# Patient Record
Sex: MALE | Race: WHITE | NOT HISPANIC OR LATINO | Employment: OTHER | URBAN - METROPOLITAN AREA
[De-identification: names, ages, dates, MRNs, and addresses within clinical notes are randomized per-mention and may not be internally consistent; named-entity substitution may affect disease eponyms.]

---

## 2017-03-11 ENCOUNTER — HOSPITAL ENCOUNTER (EMERGENCY)
Facility: HOSPITAL | Age: 42
Discharge: HOME | End: 2017-03-12
Attending: EMERGENCY MEDICINE
Payer: COMMERCIAL

## 2017-03-11 ENCOUNTER — APPOINTMENT (EMERGENCY)
Dept: RADIOLOGY | Facility: HOSPITAL | Age: 42
End: 2017-03-11
Payer: COMMERCIAL

## 2017-03-11 VITALS
RESPIRATION RATE: 16 BRPM | SYSTOLIC BLOOD PRESSURE: 134 MMHG | TEMPERATURE: 98.2 F | OXYGEN SATURATION: 96 % | BODY MASS INDEX: 20.04 KG/M2 | DIASTOLIC BLOOD PRESSURE: 64 MMHG | WEIGHT: 140 LBS | HEART RATE: 81 BPM

## 2017-03-11 DIAGNOSIS — N50.82 SCROTAL PAIN: ICD-10-CM
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
## 2017-03-11 DIAGNOSIS — M79.651 ACUTE PAIN OF RIGHT THIGH: ICD-10-CM

## 2017-03-11 PROCEDURE — 72170 X-RAY EXAM OF PELVIS: CPT

## 2017-03-11 PROCEDURE — 73552 X-RAY EXAM OF FEMUR 2/>: CPT

## 2017-03-11 RX ORDER — IBUPROFEN 600 MG/1
600 TABLET ORAL ONCE
Status: COMPLETED | OUTPATIENT
Start: 2017-03-11 | End: 2017-03-11

## 2017-03-11 RX ORDER — LIDOCAINE 50 MG/G
1 PATCH TOPICAL ONCE
Status: COMPLETED | OUTPATIENT
Start: 2017-03-11 | End: 2017-03-11

## 2017-03-11 RX ADMIN — IBUPROFEN 600 MG: 600 TABLET, FILM COATED ORAL at 23:23

## 2017-03-11 RX ADMIN — LIDOCAINE 1 PATCH: 50 PATCH CUTANEOUS at 23:24

## 2017-03-12 PROCEDURE — 99284 EMERGENCY DEPT VISIT MOD MDM: CPT

## 2017-03-12 RX ORDER — NAPROXEN 375 MG/1
375 TABLET ORAL
Qty: 15 TABLET | Refills: 0 | Status: SHIPPED | OUTPATIENT
Start: 2017-03-12 | End: 2017-04-10

## 2017-04-10 ENCOUNTER — HOSPITAL ENCOUNTER (EMERGENCY)
Facility: HOSPITAL | Age: 42
Discharge: HOME | End: 2017-04-10
Attending: EMERGENCY MEDICINE | Admitting: EMERGENCY MEDICINE
Payer: COMMERCIAL

## 2017-04-10 VITALS
TEMPERATURE: 97.7 F | OXYGEN SATURATION: 97 % | SYSTOLIC BLOOD PRESSURE: 126 MMHG | HEART RATE: 69 BPM | DIASTOLIC BLOOD PRESSURE: 56 MMHG | RESPIRATION RATE: 15 BRPM

## 2017-04-10 DIAGNOSIS — R10.9 LEFT FLANK PAIN: Primary | ICD-10-CM

## 2017-04-10 DIAGNOSIS — M79.18 MUSCULAR ABDOMINAL PAIN IN LEFT FLANK: ICD-10-CM

## 2017-04-10 PROCEDURE — 99283 EMERGENCY DEPT VISIT LOW MDM: CPT

## 2017-04-10 RX ORDER — NAPROXEN 375 MG/1
375 TABLET ORAL 2 TIMES DAILY WITH MEALS
Qty: 20 TABLET | Refills: 0 | Status: SHIPPED | OUTPATIENT
Start: 2017-04-10 | End: 2017-05-03 | Stop reason: ALTCHOICE

## 2017-04-10 RX ORDER — LIDOCAINE 50 MG/G
1 PATCH TOPICAL ONCE
Status: DISCONTINUED | OUTPATIENT
Start: 2017-04-10 | End: 2017-04-10 | Stop reason: HOSPADM

## 2017-04-10 RX ORDER — LIDOCAINE 50 MG/G
1 PATCH TOPICAL EVERY 24 HOURS
Qty: 15 PATCH | Refills: 0 | Status: SHIPPED | OUTPATIENT
Start: 2017-04-10 | End: 2017-05-03 | Stop reason: ALTCHOICE

## 2017-04-10 RX ADMIN — LIDOCAINE 1 PATCH: 50 PATCH CUTANEOUS at 01:39

## 2017-05-03 ENCOUNTER — HOSPITAL ENCOUNTER (EMERGENCY)
Facility: HOSPITAL | Age: 42
Discharge: HOME | End: 2017-05-03
Payer: COMMERCIAL

## 2017-05-03 VITALS
SYSTOLIC BLOOD PRESSURE: 131 MMHG | TEMPERATURE: 98 F | HEART RATE: 82 BPM | OXYGEN SATURATION: 96 % | WEIGHT: 170.3 LBS | RESPIRATION RATE: 16 BRPM | DIASTOLIC BLOOD PRESSURE: 59 MMHG | BODY MASS INDEX: 24.38 KG/M2
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
## 2017-05-03 PROCEDURE — 99281 EMR DPT VST MAYX REQ PHY/QHP: CPT

## 2017-06-14 ENCOUNTER — APPOINTMENT (EMERGENCY)
Dept: RADIOLOGY | Facility: HOSPITAL | Age: 42
End: 2017-06-14
Payer: COMMERCIAL

## 2017-06-14 ENCOUNTER — HOSPITAL ENCOUNTER (EMERGENCY)
Facility: HOSPITAL | Age: 42
Discharge: HOME | End: 2017-06-14
Attending: EMERGENCY MEDICINE | Admitting: EMERGENCY MEDICINE
Payer: COMMERCIAL

## 2017-06-14 VITALS
TEMPERATURE: 97.9 F | DIASTOLIC BLOOD PRESSURE: 74 MMHG | RESPIRATION RATE: 18 BRPM | BODY MASS INDEX: 20.04 KG/M2 | SYSTOLIC BLOOD PRESSURE: 122 MMHG | WEIGHT: 140 LBS | OXYGEN SATURATION: 98 % | HEART RATE: 73 BPM

## 2017-06-14 DIAGNOSIS — R07.9 CHEST PAIN: Primary | ICD-10-CM

## 2017-06-14 LAB
ANION GAP SERPL CALCULATED.3IONS-SCNC: 4 MMOL/L (ref 4–13)
ATRIAL RATE: 69 BPM
BASOPHILS # BLD AUTO: 0.11 THOUSANDS/ΜL (ref 0–0.1)
BASOPHILS NFR BLD AUTO: 1 % (ref 0–1)
BUN SERPL-MCNC: 12 MG/DL (ref 5–25)
CALCIUM SERPL-MCNC: 8.8 MG/DL (ref 8.3–10.1)
CHLORIDE SERPL-SCNC: 105 MMOL/L (ref 100–108)
CO2 SERPL-SCNC: 32 MMOL/L (ref 21–32)
CREAT SERPL-MCNC: 1.07 MG/DL (ref 0.6–1.3)
DEPRECATED D DIMER PPP: <270 NG/ML (FEU) (ref 0–424)
EOSINOPHIL # BLD AUTO: 0.82 THOUSAND/ΜL (ref 0–0.61)
EOSINOPHIL NFR BLD AUTO: 8 % (ref 0–6)
ERYTHROCYTE [DISTWIDTH] IN BLOOD BY AUTOMATED COUNT: 13 % (ref 11.6–15.1)
GFR SERPL CREATININE-BSD FRML MDRD: >60 ML/MIN/1.73SQ M
GLUCOSE SERPL-MCNC: 105 MG/DL (ref 65–140)
HCT VFR BLD AUTO: 42.7 % (ref 36.5–49.3)
HGB BLD-MCNC: 14.8 G/DL (ref 12–17)
LYMPHOCYTES # BLD AUTO: 3.93 THOUSANDS/ΜL (ref 0.6–4.47)
LYMPHOCYTES NFR BLD AUTO: 39 % (ref 14–44)
MCH RBC QN AUTO: 31 PG (ref 26.8–34.3)
MCHC RBC AUTO-ENTMCNC: 34.7 G/DL (ref 31.4–37.4)
MCV RBC AUTO: 90 FL (ref 82–98)
MONOCYTES # BLD AUTO: 0.73 THOUSAND/ΜL (ref 0.17–1.22)
MONOCYTES NFR BLD AUTO: 7 % (ref 4–12)
NEUTROPHILS # BLD AUTO: 4.5 THOUSANDS/ΜL (ref 1.85–7.62)
NEUTS SEG NFR BLD AUTO: 45 % (ref 43–75)
NRBC BLD AUTO-RTO: 0 /100 WBCS
P AXIS: 36 DEGREES
PLATELET # BLD AUTO: 239 THOUSANDS/UL (ref 149–390)
PMV BLD AUTO: 10.7 FL (ref 8.9–12.7)
POTASSIUM SERPL-SCNC: 3.5 MMOL/L (ref 3.5–5.3)
PR INTERVAL: 126 MS
QRS AXIS: 69 DEGREES
QRSD INTERVAL: 80 MS
QT INTERVAL: 390 MS
QTC INTERVAL: 417 MS
RBC # BLD AUTO: 4.77 MILLION/UL (ref 3.88–5.62)
SODIUM SERPL-SCNC: 141 MMOL/L (ref 136–145)
SPECIMEN SOURCE: NORMAL
SPECIMEN SOURCE: NORMAL
T WAVE AXIS: 65 DEGREES
TROPONIN I BLD-MCNC: 0 NG/ML (ref 0–0.08)
TROPONIN I BLD-MCNC: 0.01 NG/ML (ref 0–0.08)
VENTRICULAR RATE: 69 BPM
WBC # BLD AUTO: 10.09 THOUSAND/UL (ref 4.31–10.16)

## 2017-06-14 PROCEDURE — 80048 BASIC METABOLIC PNL TOTAL CA: CPT | Performed by: EMERGENCY MEDICINE

## 2017-06-14 PROCEDURE — 84484 ASSAY OF TROPONIN QUANT: CPT

## 2017-06-14 PROCEDURE — 71020 HB CHEST X-RAY 2VW FRONTAL&LATL: CPT

## 2017-06-14 PROCEDURE — 85379 FIBRIN DEGRADATION QUANT: CPT | Performed by: EMERGENCY MEDICINE

## 2017-06-14 PROCEDURE — 85025 COMPLETE CBC W/AUTO DIFF WBC: CPT | Performed by: EMERGENCY MEDICINE

## 2017-06-14 PROCEDURE — 99285 EMERGENCY DEPT VISIT HI MDM: CPT

## 2017-06-14 PROCEDURE — 36415 COLL VENOUS BLD VENIPUNCTURE: CPT

## 2017-06-14 PROCEDURE — 93005 ELECTROCARDIOGRAM TRACING: CPT | Performed by: EMERGENCY MEDICINE

## 2017-06-14 PROCEDURE — 93005 ELECTROCARDIOGRAM TRACING: CPT

## 2017-06-14 RX ORDER — NAPROXEN 500 MG/1
500 TABLET ORAL 2 TIMES DAILY WITH MEALS
Qty: 20 TABLET | Refills: 0 | Status: SHIPPED | OUTPATIENT
Start: 2017-06-14 | End: 2017-11-12

## 2017-06-15 LAB
ATRIAL RATE: 58 BPM
ATRIAL RATE: 58 BPM
P AXIS: 49 DEGREES
P AXIS: 59 DEGREES
PR INTERVAL: 152 MS
PR INTERVAL: 168 MS
QRS AXIS: 65 DEGREES
QRS AXIS: 67 DEGREES
QRSD INTERVAL: 82 MS
QRSD INTERVAL: 82 MS
QT INTERVAL: 424 MS
QT INTERVAL: 430 MS
QTC INTERVAL: 416 MS
QTC INTERVAL: 422 MS
T WAVE AXIS: 45 DEGREES
T WAVE AXIS: 53 DEGREES
VENTRICULAR RATE: 58 BPM
VENTRICULAR RATE: 58 BPM

## 2017-06-19 ENCOUNTER — GENERIC CONVERSION - ENCOUNTER (OUTPATIENT)
Dept: OTHER | Facility: OTHER | Age: 42
End: 2017-06-19

## 2017-06-23 ENCOUNTER — HOSPITAL ENCOUNTER (OUTPATIENT)
Dept: NON INVASIVE DIAGNOSTICS | Facility: HOSPITAL | Age: 42
Discharge: HOME | End: 2017-06-23
Attending: EMERGENCY MEDICINE
Payer: COMMERCIAL

## 2017-06-23 PROCEDURE — 93017 CV STRESS TEST TRACING ONLY: CPT

## 2017-06-30 ENCOUNTER — ALLSCRIPTS OFFICE VISIT (OUTPATIENT)
Dept: OTHER | Facility: OTHER | Age: 42
End: 2017-06-30

## 2017-06-30 DIAGNOSIS — R07.9 CHEST PAIN: ICD-10-CM

## 2017-09-21 ENCOUNTER — GENERIC CONVERSION - ENCOUNTER (OUTPATIENT)
Dept: OTHER | Facility: OTHER | Age: 42
End: 2017-09-21

## 2017-09-21 ENCOUNTER — APPOINTMENT (OUTPATIENT)
Dept: LAB | Facility: CLINIC | Age: 42
End: 2017-09-21
Payer: COMMERCIAL

## 2017-09-21 DIAGNOSIS — R07.9 CHEST PAIN: ICD-10-CM

## 2017-09-21 LAB
CRP SERPL QL: <3 MG/L
ERYTHROCYTE [SEDIMENTATION RATE] IN BLOOD: 8 MM/HOUR (ref 0–10)

## 2017-09-21 PROCEDURE — 86140 C-REACTIVE PROTEIN: CPT

## 2017-09-21 PROCEDURE — 36415 COLL VENOUS BLD VENIPUNCTURE: CPT

## 2017-09-21 PROCEDURE — 85652 RBC SED RATE AUTOMATED: CPT

## 2017-11-12 ENCOUNTER — APPOINTMENT (EMERGENCY)
Dept: RADIOLOGY | Facility: HOSPITAL | Age: 42
End: 2017-11-12
Payer: COMMERCIAL

## 2017-11-12 ENCOUNTER — HOSPITAL ENCOUNTER (EMERGENCY)
Facility: HOSPITAL | Age: 42
Discharge: HOME | End: 2017-11-12
Admitting: EMERGENCY MEDICINE
Payer: COMMERCIAL

## 2017-11-12 VITALS
SYSTOLIC BLOOD PRESSURE: 117 MMHG | TEMPERATURE: 98.2 F | HEART RATE: 84 BPM | OXYGEN SATURATION: 95 % | DIASTOLIC BLOOD PRESSURE: 55 MMHG | RESPIRATION RATE: 16 BRPM

## 2017-11-12 DIAGNOSIS — J40 BRONCHITIS: Primary | ICD-10-CM

## 2017-11-12 PROCEDURE — 71020 HB CHEST X-RAY 2VW FRONTAL&LATL: CPT

## 2017-11-12 PROCEDURE — 99283 EMERGENCY DEPT VISIT LOW MDM: CPT

## 2017-11-12 PROCEDURE — 94640 AIRWAY INHALATION TREATMENT: CPT

## 2017-11-12 RX ORDER — ALBUTEROL SULFATE 90 UG/1
2 AEROSOL, METERED RESPIRATORY (INHALATION) EVERY 6 HOURS PRN
Qty: 1 INHALER | Refills: 0 | Status: SHIPPED | OUTPATIENT
Start: 2017-11-12 | End: 2018-11-12

## 2017-11-12 RX ORDER — PREDNISONE 20 MG/1
20 TABLET ORAL 2 TIMES DAILY WITH MEALS
Qty: 10 TABLET | Refills: 0 | Status: SHIPPED | OUTPATIENT
Start: 2017-11-12 | End: 2017-11-17

## 2017-11-12 RX ADMIN — IPRATROPIUM BROMIDE 0.5 MG: 0.5 SOLUTION RESPIRATORY (INHALATION) at 21:28

## 2017-11-12 RX ADMIN — ALBUTEROL SULFATE 5 MG: 2.5 SOLUTION RESPIRATORY (INHALATION) at 21:28

## 2017-11-13 NOTE — ED PROVIDER NOTES
History  Chief Complaint   Patient presents with    Cough     cough for 3 days  History provided by:  Patient  Cough   Cough characteristics:  Non-productive  Severity:  Moderate  Onset quality:  Gradual  Duration:  2 weeks  Progression:  Waxing and waning  Context: exposure to allergens, sick contacts, smoke exposure and upper respiratory infection    Context: not animal exposure, not fumes, not occupational exposure, not weather changes and not with activity    Relieved by:  Nothing  Ineffective treatments:  Cough suppressants  Associated symptoms: no myalgias and no sore throat        None       Past Medical History:   Diagnosis Date    No known health problems     No known health problems        Past Surgical History:   Procedure Laterality Date    HIP SURGERY Right     screws and plate    JOINT REPLACEMENT      TOTAL HIP ARTHROPLASTY         History reviewed  No pertinent family history  I have reviewed and agree with the history as documented  Social History   Substance Use Topics    Smoking status: Current Every Day Smoker     Packs/day: 0 20    Smokeless tobacco: Never Used    Alcohol use No        Review of Systems   Constitutional: Negative for activity change, appetite change and fatigue  HENT: Negative for nosebleeds, sneezing, sore throat, trouble swallowing and voice change  Eyes: Negative for photophobia, pain and visual disturbance  Respiratory: Positive for cough  Negative for apnea, choking and stridor  Cardiovascular: Negative for palpitations and leg swelling  Gastrointestinal: Negative for anal bleeding and constipation  Endocrine: Negative for cold intolerance, heat intolerance, polydipsia and polyphagia  Genitourinary: Negative for decreased urine volume, enuresis, frequency, genital sores and urgency  Musculoskeletal: Negative for joint swelling and myalgias  Allergic/Immunologic: Negative for environmental allergies and food allergies  Neurological: Negative for tremors, seizures, speech difficulty and weakness  Hematological: Negative for adenopathy  Psychiatric/Behavioral: Negative for behavioral problems, decreased concentration, dysphoric mood and hallucinations  Physical Exam  ED Triage Vitals [11/12/17 2021]   Temperature Pulse Respirations Blood Pressure SpO2   98 2 °F (36 8 °C) 84 16 117/55 95 %      Temp Source Heart Rate Source Patient Position - Orthostatic VS BP Location FiO2 (%)   Oral -- Sitting Left arm --      Pain Score       --           Orthostatic Vital Signs  Vitals:    11/12/17 2021   BP: 117/55   Pulse: 84   Patient Position - Orthostatic VS: Sitting       Physical Exam   Constitutional: He is oriented to person, place, and time  He appears well-developed and well-nourished  No distress  HENT:   Head: Normocephalic and atraumatic  Right Ear: External ear normal    Left Ear: External ear normal    Nose: Nose normal    Mouth/Throat: Oropharynx is clear and moist    Eyes: Conjunctivae and EOM are normal  Pupils are equal, round, and reactive to light  Neck: Normal range of motion  Neck supple  Cardiovascular: Normal rate, regular rhythm and normal heart sounds  Exam reveals no gallop and no friction rub  No murmur heard  Pulmonary/Chest: Effort normal  No respiratory distress  He has wheezes  Abdominal: Soft  Bowel sounds are normal    Neurological: He is alert and oriented to person, place, and time  Skin: Skin is warm and dry  He is not diaphoretic  Psychiatric: He has a normal mood and affect  His behavior is normal    Vitals reviewed        ED Medications  Medications   albuterol inhalation solution 5 mg (not administered)   ipratropium (ATROVENT) 0 02 % inhalation solution 0 5 mg (not administered)       Diagnostic Studies  Results Reviewed     None                 XR chest 2 views   ED Interpretation by Aldo Mathias PA-C (11/12 2123)   No acute abnormalities Procedures  Procedures       Phone Contacts  ED Phone Contact    ED Course  ED Course                                MDM  CritCare Time    Disposition  Final diagnoses:   Bronchitis     Time reflects when diagnosis was documented in both MDM as applicable and the Disposition within this note     Time User Action Codes Description Comment    11/12/2017  9:24 PM Willy, [facility] Bronchitis       ED Disposition     ED Disposition Condition Comment    Discharge  [facility]  discharge to home/self care  Condition at discharge: Stable        Follow-up Information     Follow up With Specialties Details Why Contact Info    Maulik Burton MD Internal Medicine Schedule an appointment as soon as possible for a visit  [address]  [phone]          Patient's Medications   Discharge Prescriptions    ALBUTEROL (PROVENTIL HFA,VENTOLIN HFA) 90 MCG/ACT INHALER    Inhale 2 puffs every 6 (six) hours as needed for wheezing       Start Date: 11/12/2017End Date: 11/12/2018       Order Dose: 2 puffs       Quantity: 1 Inhaler    Refills: 0    PREDNISONE 20 MG TABLET    Take 1 tablet by mouth 2 (two) times a day with meals for 5 days       Start Date: 11/12/2017End Date: 11/17/2017       Order Dose: 20 mg       Quantity: 10 tablet    Refills: 0     No discharge procedures on file      ED Provider  Electronically Signed by           Arabella Fernandez PA-C  11/12/17 4942

## 2017-11-13 NOTE — DISCHARGE INSTRUCTIONS

## 2018-01-09 NOTE — PROGRESS NOTES
Patient Health Assessment    Date:            12/08/2016  Blood Pressure:  120/71  Pulse:           68  Age:             41  Weight:          0 lbs  Height/Length:   0' 0"  Body Mass Index: 0 0  Provider:        LUCIA  Clinic:          [facility]        Medical Alert: Tobacco User    Hip surgery  Medications: Percocet 5mg/325mg    Percocet 5mg/325mg    Percocet 5mg/325mg    Amoxicillin 500 MG  Allergies:  Since Last Visit: Medical Alert: No Change    Medications: No Change    Allergies:        No Change  Pain Scale Type: Numeric Pain ScalePain Level: 6  Description:  Patient presented for emergency visit  [address]; No contra to dx tx  Pain lower right side  #28 and 29 are severely decayed and non-restorable  No swelling  PAP on #29 seen on xrays  recommended to return for both ext on  #28 and 29  Rx sent online    NV exts  MQ    ----- Signed on Thursday, December 08, 2016 at 10:46:59 AM  -----  ----- Provider: [email or web address] Queen Schirmer, DDS -- Clinic: [place] -----

## 2018-01-10 NOTE — PROGRESS NOTES
Patient Health Assessment    Date:            12/14/2016  Blood Pressure:  120/76  Pulse:           96  Age:             41  Weight:          0 lbs  Height/Length:   0' 0"  Body Mass Index: 0 0  Provider:        LUCIA  Clinic:          [facility]: Tobacco User    Hip surgery  Medications: Percocet 5mg/325mg    Percocet 5mg/325mg    Percocet 5mg/325mg    Amoxicillin 500 MG    Amoxicillin 500 MG    Ibuprofen 600 MG  Allergies:  Since Last Visit: Medical Alert: No Change    Medications: No Change    Allergies:        No Change  Pain Scale Type: Numeric Pain ScalePain Level: 0  Description:  Patient presents for ext #28, 34  Reviewed PMH, patient denies any changes  Explained benefits and risks of  procedure to pt, pt understands and consents to treatment  Signed and  uploaded consent form to doc center  Applied topical benzocaine, administered 2 carps of 2% Lidocaine w/ 1:100,000  via DELPHINE block and infiltration  Gingiva elevated with #9 periosteal elevator,  tooth #28, 29 luxated with straight elevators and removed with forceps  Socket curetted  Chromic gut 3-0 sutures placed  Placed gauze and provided  verbal and written POI  Instructed pt to take 600mg ibuprofen and 500mg tylenol   PRN pain  Pt tolerated procedure well, left satisfied and ambulatory      NV: Comp exam    AH/MQ    ----- Signed on Wednesday, December 14, 2016 at 1:59:45 PM  -----  ----- Provider: JENNIFER - [facility], Dentist -- Clinic: [place] -----

## 2018-01-10 NOTE — PROGRESS NOTES
Medical Alert: Tobacco User    Hip surgery  Medications: Percocet 5mg/325mg    Percocet 5mg/325mg    Percocet 5mg/325mg    Amoxicillin 500 MG    Amoxicillin 500 MG    Ibuprofen 600 MG  Allergies:  Since Last Visit: Medical Alert: No Change    Medications: No Change    Allergies:        No Change  Pain Scale Type: Numeric Pain ScalePain Level: 0  Description:    Patient Health Assessment    Date:            09/21/2017  Blood Pressure:  116/76  Pulse:           75  Age:             41  Weight:          146 lbs  Height/Length:   5' 10"  Body Mass Index: 20 8  Provider:        [email or web address]  Clinic:          [facility]      Pt presents for #10 DIL  PMH review, no changes  Discussed with patient need  for RCT if pulp exposure occurs or in future if pulp is inflamed  Pt  understands and consents  Applied topical benzocaine, administered 1 carps 4%  articaine 1:100k epi via local infiltration  Prep with 245 carbide on high  speed  Caries removed with round carbide on slow speed  Placed mylar strip  Isolation with cotton rolls and dri-angles  Limelite liner placed  Etch with  37% H2PO4, rinse, dry  Applied Vividbond with 15 second scrubx2, gentle air dry   and light cured  Restored with Beautifil flowable and Alpha II composite shade   A3 and light cured  Refined with finishing burs, polished with enhance  point  Verified occlusion and contacts  Pt informed that due to how deep the  caries is, there is a high risk that a RCT may be needed for #10 in the future,   Pt understood  Pt left satisfied and ambulatory      NV: CompX and FMX if Pt is interested    BH/MQ    ----- Signed on Thursday, September 21, 2017 at 4:49:14 PM  -----  ----- Provider: [email or web address] - [facility], Dentist -- Clinic: [facility] -----

## 2018-01-11 NOTE — PROGRESS NOTES
Patient Health Assessment    Date:            06/19/2017  Blood Pressure:  109/74  Pulse:           59  Age:             41  Weight:          146 lbs  Height/Length:   0' 0"  Body Mass Index: 0 0  Provider:        LUCIA  Clinic:          [facility]: Tobacco User    Hip surgery  Medications: Percocet 5mg/325mg    Percocet 5mg/325mg    Percocet 5mg/325mg    Amoxicillin 500 MG    Amoxicillin 500 MG    Ibuprofen 600 MG  Allergies:  Since Last Visit: Medical Alert: No Change    Medications: No Change    Allergies:        No Change  Pain Scale Type: Numeric Pain ScalePain Level: 0  Description:  Pt presents for limited exam with CC ''I chipped a tooth at a wedding last  weekend''  PMH reviewed, no changes  Obtained PA #10  Tooth #10 is grossly  decayed with decay radiographically extremely close to pulp, no PAP present  Palpation (-)  percussion (-) endo ice (vital)  Pt denies any history of pain  from tooth  Discussed with patient that tooth has large cavity that will likely   go into the nerve and that tooth will likely need RCT or ext  Offered tx of  caries excavation and resin or pulpectomy and CaOH placement depending on  extend of decay  Pt understands and consents to treatment  Advised pt to schedule for comprehensive exam  Pt seems uninterested but said  ok  NV: caries excavation, if pulp exposure access canal and place CaOH until pt  decides on ext or RCT      Essie/TORIBIO    ----- Signed on Monday, June 19, 2017 at 2:02:28 PM  -----  ----- Provider: GALA - [facility], Dentist -- Clinic: [facility] -----

## 2018-01-12 NOTE — PROGRESS NOTES
1 PA LR  pt here for emergency visit  MU: pt not aware of his height or weight  Medical Alert: Tobacco User    Hip surgery-broke hip/ had screws + plate placed  Medications:  none  Allergies:      none  Since Last Visit: Medical Alert: Change    Medications: No Change    Allergies:        No Change  Pain Scale Type: Numeric Pain ScalePain Level: 3  Description: LR cracked tooth/ some pain for approximately one week  - on /off pain - pt has been taking Ibuprofen for pain which is giving relief  from the pain  will have dr carbajal limited exam/ eval    ----- Signed on Thursday, December 08, 2016 at 10:20:47 AM  -----  ----- Provider: [email or web address] Brittnee Maxwell RD -- Clinic: [address] [place] [address] -----

## 2018-01-13 VITALS
DIASTOLIC BLOOD PRESSURE: 66 MMHG | SYSTOLIC BLOOD PRESSURE: 100 MMHG | HEART RATE: 76 BPM | TEMPERATURE: 97.8 F | HEIGHT: 69 IN | WEIGHT: 175.04 LBS | BODY MASS INDEX: 25.93 KG/M2

## 2018-01-15 NOTE — PROGRESS NOTES
Pt presents for limited exam with CC ''tooth hurts and I was swollen this  morning lower left''  PMH reviewed, no changes  Obtained PA #22  Tooth #22  has gross decay involving the pulp  Palpation (+),  percussion (+)  Presented  tx option of RCT/crown, palliative pulpectomy, or ext  Pt elects for ext but  does not want it done today  Provided prescription for amoxicillin, scheduled  pt for ext #22  Discussed with pt that he needs comp exam and definitive  treatment  Pt understands, left ambulatory and satisfied      NV: ext #22  NNV: comp exam, FMX    Essie/MQ    ----- Signed on Monday, September 12, 2016 at 4:52:40 PM  -----  ----- Provider: [email or web address] - [facility], Dentist -- Clinic: [facility] -----

## 2018-01-15 NOTE — PROGRESS NOTES
Patient Health Assessment    Date:            09/12/2016  Blood Pressure:  122/72  Pulse:           71  Age:             40  Weight:          0 lbs  Height/Length:   0' 0"  Body Mass Index: 0 0  Provider:        [email or web address]  Clinic:          [facility]      2 PA's taken  Medical Alert: Tobacco User  Medications: none currently  Allergies:      none  Since Last Visit: Medical Alert: No Change    Medications: No Change    Allergies:        No Change  Pain Scale Type: Numeric Pain ScalePain Level: 5  Description: LL gums red and swollen  pain present - started last night - pain comes and goes  pt was taking Advil for pain  pt reports face swollen this morning- used ice to reduce swelling  Location: (* )Tooth--pt points to LL side/canine area and posterior    ()Other  Quality:  ()Dull    ()Sharp    ()Throbbing    ()Unable to assess  Duration: (*)< 24 hours    ()> 24 hours    ()1-3 days    ()4-6 days    ()7-14 days    ()14-21 days    ()21-30 days    ()Over 30 days    ----- Signed on Monday, September 12, 2016 at 1:37:29 PM  -----  ----- Provider: [email or web address] - Sara Lenz [facility] -- Clinic: [facility] -----

## 2018-02-27 ENCOUNTER — HOSPITAL ENCOUNTER (EMERGENCY)
Facility: HOSPITAL | Age: 43
Discharge: HOME | End: 2018-02-27
Payer: COMMERCIAL

## 2018-02-27 VITALS
BODY MASS INDEX: 26.58 KG/M2 | RESPIRATION RATE: 18 BRPM | HEART RATE: 76 BPM | SYSTOLIC BLOOD PRESSURE: 122 MMHG | DIASTOLIC BLOOD PRESSURE: 68 MMHG | WEIGHT: 180 LBS | TEMPERATURE: 97.4 F | OXYGEN SATURATION: 98 %

## 2018-02-27 DIAGNOSIS — J06.9 URI (UPPER RESPIRATORY INFECTION): Primary | ICD-10-CM

## 2018-02-27 PROCEDURE — 99282 EMERGENCY DEPT VISIT SF MDM: CPT

## 2018-02-27 NOTE — DISCHARGE INSTRUCTIONS
YOU MAY USE SALINE NASAL SPRAY  YOU MAY USE AFRIN FOR NASAL CONGESTION BUT DO NOT USE MORE THAN 3 DAYS  Upper Respiratory Infection   WHAT YOU NEED TO KNOW:   An upper respiratory infection is also called the common cold  It is an infection that can affect your nose, throat, ears, and sinuses  For healthy people, the common cold is usually not serious and does not need special treatment  Cold symptoms are usually worst for the first 3 to 5 days  Most people get better in 7 to 14 days  You may continue to cough for 2 to 3 weeks  Colds are caused by viruses and do not get better with antibiotics  DISCHARGE INSTRUCTIONS:   Return to the emergency department if:   · You have chest pain or trouble breathing  Contact your healthcare provider if:   · You have a fever over 102ºF (39°C)  · Your sore throat gets worse or you see white or yellow spots in your throat  · Your symptoms get worse after 3 to 5 days or your cold is not better in 14 days  · You have a rash anywhere on your skin  · You have large, tender lumps in your neck  · You have thick, green or yellow drainage from your nose  · You cough up thick yellow, green, or bloody mucus  · You have vomiting for more than 24 hours and cannot keep fluids down  · You have a bad earache  · You have questions or concerns about your condition or care  Medicines: You may need any of the following:  · Decongestants  help reduce nasal congestion and help you breathe more easily  If you take decongestant pills, they may make you feel restless or cause problems with your sleep  Do not use decongestant sprays for more than a few days  · Cough suppressants  help reduce coughing  Ask your healthcare provider which type of cough medicine is best for you  · NSAIDs , such as ibuprofen, help decrease swelling, pain, and fever  NSAIDs can cause stomach bleeding or kidney problems in certain people   If you take blood thinner medicine, always ask your healthcare provider if NSAIDs are safe for you  Always read the medicine label and follow directions  · Acetaminophen  decreases pain and fever  It is available without a doctor's order  Ask how much to take and how often to take it  Follow directions  Read the labels of all other medicines you are using to see if they also contain acetaminophen, or ask your doctor or pharmacist  Acetaminophen can cause liver damage if not taken correctly  Do not use more than 4 grams (4,000 milligrams) total of acetaminophen in one day  · Take your medicine as directed  Contact your healthcare provider if you think your medicine is not helping or if you have side effects  Tell him or her if you are allergic to any medicine  Keep a list of the medicines, vitamins, and herbs you take  Include the amounts, and when and why you take them  Bring the list or the pill bottles to follow-up visits  Carry your medicine list with you in case of an emergency  Follow up with your healthcare provider as directed:  Write down your questions so you remember to ask them during your visits  Self-care:   · Rest as much as possible  Slowly start to do more each day  · Drink more liquids as directed  Liquids will help thin and loosen mucus so you can cough it up  Liquids will also help prevent dehydration  Liquids that help prevent dehydration include water, fruit juice, and broth  Do not drink liquids that contain caffeine  Caffeine can increase your risk for dehydration  Ask your healthcare provider how much liquid to drink each day  · Soothe a sore throat  Gargle with warm salt water  This helps your sore throat feel better  Make salt water by dissolving ¼ teaspoon salt in 1 cup warm water  You may also suck on hard candy or throat lozenges  You may use a sore throat spray  · Use a humidifier or vaporizer  Use a cool mist humidifier or a vaporizer to increase air moisture in your home   This may make it easier for you to breathe and help decrease your cough  · Use saline nasal drops as directed  These help relieve congestion  · Apply petroleum-based jelly around the outside of your nostrils  This can decrease irritation from blowing your nose  · Do not smoke  Nicotine and other chemicals in cigarettes and cigars can make your symptoms worse  They can also cause infections such as bronchitis or pneumonia  Ask your healthcare provider for information if you currently smoke and need help to quit  E-cigarettes or smokeless tobacco still contain nicotine  Talk to your healthcare provider before you use these products  Prevent spreading your cold to others:   · Try to stay away from other people during the first 2 to 3 days of your cold when it is more easily spread  · Do not share food or drinks  · Do not share hand towels with household members  · Wash your hands often, especially after you blow your nose  Turn away from other people and cover your mouth and nose with a tissue when you sneeze or cough  © 2017 [facility] Information is for End User's use only and may not be sold, redistributed or otherwise used for commercial purposes  All illustrations and images included in CareNotes® are the copyrighted property of [employer] , [employer]  or David Hinds  The above information is an  only  It is not intended as medical advice for individual conditions or treatments  Talk to your doctor, nurse or pharmacist before following any medical regimen to see if it is safe and effective for you

## 2018-02-27 NOTE — ED PROVIDER NOTES
History  Chief Complaint   Patient presents with    Nasal Congestion     Pt complains of nasal congestion for aprox 2 days        41 yo M who presents today for evaluation of nasal congestion x 2 days  Also c/o rhinorrhea and scant bleeding from the nares mixed with mucous with blowing - no continuous nosebleed  No fevers no chills no sinus pressure or facial swelling  No coughing sore throat chest pain shortness of breath  No history of similar symptoms  He denies any trauma or injury to his nose  He denies use of intranasal substances  Prior to Admission Medications   Prescriptions Last Dose Informant Patient Reported? Taking? albuterol (PROVENTIL HFA,VENTOLIN HFA) 90 mcg/act inhaler   No No   Sig: Inhale 2 puffs every 6 (six) hours as needed for wheezing      Facility-Administered Medications: None       Past Medical History:   Diagnosis Date    No known health problems     No known health problems        Past Surgical History:   Procedure Laterality Date    HIP SURGERY Right     screws and plate    JOINT REPLACEMENT      TOTAL HIP ARTHROPLASTY         History reviewed  No pertinent family history  I have reviewed and agree with the history as documented  Social History   Substance Use Topics    Smoking status: Current Every Day Smoker     Packs/day: 0 20    Smokeless tobacco: Never Used    Alcohol use No        Review of Systems   Constitutional: Negative for chills and fever  HENT: Positive for congestion and nosebleeds  Negative for facial swelling, rhinorrhea, sinus pain and sinus pressure  Respiratory: Negative for cough and shortness of breath  Cardiovascular: Negative for chest pain         Physical Exam  ED Triage Vitals   Temperature Pulse Respirations Blood Pressure SpO2   02/27/18 1453 02/27/18 1452 02/27/18 1452 02/27/18 1453 02/27/18 1452   (!) 97 4 °F (36 3 °C) 76 18 122/68 98 %      Temp Source Heart Rate Source Patient Position - Orthostatic VS BP Location FiO2 (%)   02/27/18 1452 02/27/18 1452 -- 02/27/18 1452 --   Oral Monitor  Right arm       Pain Score       02/27/18 1452       Worst Possible Pain           Orthostatic Vital Signs  Vitals:    02/27/18 1452 02/27/18 1453   BP:  122/68   Pulse: 76        Physical Exam   Constitutional: He is oriented to person, place, and time  Vital signs are normal  He appears well-developed and well-nourished  Non-toxic appearance  He does not have a sickly appearance  He does not appear ill  No distress  HENT:   Head: Normocephalic and atraumatic  Right Ear: Hearing, tympanic membrane, external ear and ear canal normal    Left Ear: Hearing, tympanic membrane, external ear and ear canal normal    Nose: Mucosal edema, nasal deformity (deviated to left, at baseline per patient) and septal deviation (deviated to the left, at baseline per patient) present  No rhinorrhea, nose lacerations, sinus tenderness or nasal septal hematoma  No epistaxis  No foreign bodies  Mouth/Throat: Uvula is midline, oropharynx is clear and moist and mucous membranes are normal    Eyes: Conjunctivae and EOM are normal  Pupils are equal, round, and reactive to light  Neck: Normal range of motion  Neck supple  Cardiovascular: Normal rate, regular rhythm and normal heart sounds  Exam reveals no gallop and no friction rub  No murmur heard  Pulmonary/Chest: Effort normal and breath sounds normal  No respiratory distress  He has no decreased breath sounds  He has no wheezes  He has no rhonchi  He has no rales  Musculoskeletal: Normal range of motion  Neurological: He is alert and oriented to person, place, and time  Skin: Skin is warm and dry  Capillary refill takes less than 2 seconds  No rash noted  He is not diaphoretic  Psychiatric: He has a normal mood and affect  Nursing note and vitals reviewed        ED Medications  Medications - No data to display    Diagnostic Studies  Results Reviewed     None                 No orders to display              Procedures  Procedures       Phone Contacts  ED Phone Contact    ED Course  ED Course                                MDM  Number of Diagnoses or Management Options  URI (upper respiratory infection): new and does not require workup  Diagnosis management comments:   Nasal congestion for two days  Discussed supportive care: nasal saline and afrin x 3 days  F/u with PCP or ENT  RTED precautions given  Patient verbalizes understanding and agrees with plan  Patient Progress  Patient progress: stable    CritCare Time    Disposition  Final diagnoses:   URI (upper respiratory infection)     Time reflects when diagnosis was documented in both MDM as applicable and the Disposition within this note     Time User Action Codes Description Comment    2/27/2018  3:21 PM Alida Fernando Add [J06 9] URI (upper respiratory infection)       ED Disposition     ED Disposition Condition Comment    Discharge  Veverly Sarbjit  discharge to home/self care  Condition at discharge: Good        Follow-up Information     Follow up With Specialties Details Why Contact Info Additional Information    Mars Anderson MD Internal Medicine Schedule an appointment as soon as possible for a visit  [address] Emergency Department Emergency Medicine  If symptoms worsen [address] [place] [address]  [phone]  ED, [address], [address] [place] [address] MD [facility] Schedule an appointment as soon as possible for a visit NOSE FOLLOW UP [address]  [phone]           Patient's Medications   Discharge Prescriptions    No medications on file     No discharge procedures on file      ED Provider  Electronically Signed by           Mónica Miranda PA-C  02/27/18 9170

## 2018-03-01 ENCOUNTER — OFFICE VISIT (OUTPATIENT)
Dept: INTERNAL MEDICINE CLINIC | Facility: CLINIC | Age: 43
End: 2018-03-01
Payer: COMMERCIAL

## 2018-03-01 VITALS
HEIGHT: 69 IN | TEMPERATURE: 97.4 F | HEART RATE: 76 BPM | DIASTOLIC BLOOD PRESSURE: 52 MMHG | SYSTOLIC BLOOD PRESSURE: 98 MMHG | BODY MASS INDEX: 25.67 KG/M2 | WEIGHT: 173.28 LBS

## 2018-03-01 DIAGNOSIS — R09.81 NASAL CONGESTION: ICD-10-CM

## 2018-03-01 DIAGNOSIS — J34.2 NASAL SEPTAL DEVIATION: Primary | ICD-10-CM

## 2018-03-01 PROBLEM — F17.210 DEPENDENCE ON NICOTINE FROM CIGARETTES: Status: ACTIVE | Noted: 2018-03-01

## 2018-03-01 PROCEDURE — 99213 OFFICE O/P EST LOW 20 MIN: CPT | Performed by: INTERNAL MEDICINE

## 2018-03-01 RX ORDER — FLUTICASONE PROPIONATE 50 MCG
1 SPRAY, SUSPENSION (ML) NASAL DAILY
Qty: 16 G | Refills: 0 | Status: SHIPPED | OUTPATIENT
Start: 2018-03-01 | End: 2018-09-24 | Stop reason: ALTCHOICE

## 2018-03-01 NOTE — PROGRESS NOTES
INTERNAL MEDICINE FOLLOW-UP OFFICE VISIT  [facility]  [address] [place] [address]    NAME: Barb Krishnan  AGE: 40 y o  SEX: male    DATE OF ENCOUNTER: 3/1/2018    Assessment and Plan     Problem List Items Addressed This Visit        Respiratory    Nasal septal deviation - Primary       Other    Nasal congestion     No systemic signs, afebrile, no fevers/chills, no cough/sore throat  Likely viral, worsened as pt has baseline nasal septal deviation  - Recommend symptomatic support with flonase as needed  - nasal saline spray for intermittent nasal dryness  - Pt advised to return for follow-up if symptoms worsen or patient develops systemic symptoms  - No need for ENT eval at this time for septal deviation as pt states normally he does not have an issue with breathing         Relevant Medications    sodium chloride (OCEAN) 0 65 % nasal spray    fluticasone (FLONASE) 50 mcg/act nasal spray          No orders of the defined types were placed in this encounter       - Counseling Documentation: patient was counseled regarding: diagnostic results, instructions for management, risk factor reductions, prognosis, patient and family education, impressions, risks and benefits of treatment options and importance of compliance with treatment  - Counseling Time: not applicable  - Barriers to treatment: None  - Medication Side Effects: Adverse side effects of medications were reviewed with the patient/guardian today  - Self Referrals: No    Chief Complaint     Chief Complaint   Patient presents with    Dryness of his nose    nosebleeding     "blood clots"    bump on his nose     "outside area"       History of Present Illness     Mr Adan Robles is a 41 yo M with a past medical history of asthma here in clinic today for evaluation of nasal congestion  Pt states he has nasal congestion for 1 week, worse at night    Denies any systemic features such as fevers, chills, nausea or vomiting  Pt was evaluated in ED on 2/27 and told to use nasal saline spray and Afrin for symptomatic support, though was not given Rx  Pt has not tried any interventions  Does report intermittent nasal bleeding when blowing nose at times  Patient does have baseline anatomic abnormality of left nasal septum deviation  He denies trauma in the past to nose  States nasal congestion is not a chronic isuue normally        The following portions of the patient's history were reviewed and updated as appropriate: allergies, current medications, past family history, past medical history, past social history, past surgical history and problem list     Review of Systems     Review of Systems   Constitutional: Negative for chills, fatigue and fever  HENT: Positive for congestion  Negative for sinus pain, sinus pressure, sneezing and sore throat  Eyes: Negative  Respiratory: Negative for cough, shortness of breath and wheezing  Cardiovascular: Negative  Gastrointestinal: Negative  Endocrine: Negative  Genitourinary: Negative  Musculoskeletal: Negative  Allergic/Immunologic: Negative for environmental allergies  Neurological: Negative  Hematological: Negative  Psychiatric/Behavioral: Negative  Active Problem List     Patient Active Problem List   Diagnosis    Back pain    Small bowel obstruction due to adhesions    Dependence on nicotine from cigarettes    Nasal congestion    Nasal septal deviation       Objective     BP 98/52 (BP Location: Left arm, Patient Position: Sitting, Cuff Size: Adult)   Pulse 76   Temp (!) 97 4 °F (36 3 °C) (Oral)   Ht 5' 9" (1 753 m)   Wt 78 6 kg (173 lb 4 5 oz)   BMI 25 59 kg/m²     Physical Exam   Constitutional: He is oriented to person, place, and time  He appears well-developed and well-nourished  HENT:   Head: Normocephalic and atraumatic  Nasal septal deviation L   Eyes: Pupils are equal, round, and reactive to light     Neck: Normal range of motion  Cardiovascular: Normal rate and regular rhythm  Pulmonary/Chest: Effort normal  No respiratory distress  He has no wheezes  He has no rales  Abdominal: Soft  He exhibits no distension  There is no tenderness  Musculoskeletal: Normal range of motion  He exhibits no edema  Neurological: He is alert and oriented to person, place, and time  Skin: Skin is warm and dry  Vitals reviewed        Pertinent Laboratory/Diagnostic Studies:  CBC:   Lab Results   Component Value Date/Time    WBC 10 09 06/14/2017 07:14 PM    WBC 11 49 (H) 02/26/2014 11:33 AM    RBC 4 77 06/14/2017 07:14 PM    RBC 5 25 02/26/2014 11:33 AM    HGB 14 8 06/14/2017 07:14 PM    HGB 15 9 02/26/2014 11:33 AM    HCT 42 7 06/14/2017 07:14 PM    HCT 46 5 02/26/2014 11:33 AM    MCV 90 06/14/2017 07:14 PM    MCV 89 02/26/2014 11:33 AM    MCH 31 0 06/14/2017 07:14 PM    MCH 30 3 02/26/2014 11:33 AM    MCHC 34 7 06/14/2017 07:14 PM    MCHC 34 2 02/26/2014 11:33 AM    RDW 13 0 06/14/2017 07:14 PM    RDW 12 2 02/26/2014 11:33 AM    MPV 10 7 06/14/2017 07:14 PM    MPV 10 4 02/26/2014 11:33 AM     06/14/2017 07:14 PM     02/26/2014 11:33 AM    NRBC 0 06/14/2017 07:14 PM    NEUTOPHILPCT 45 06/14/2017 07:14 PM    NEUTOPHILPCT 72 02/24/2014 06:51 AM    LYMPHOPCT 39 06/14/2017 07:14 PM    LYMPHOPCT 18 02/24/2014 06:51 AM    MONOPCT 7 06/14/2017 07:14 PM    MONOPCT 8 02/24/2014 06:51 AM    EOSPCT 8 (H) 06/14/2017 07:14 PM    EOSPCT 1 02/24/2014 06:51 AM    BASOPCT 1 06/14/2017 07:14 PM    BASOPCT 1 02/24/2014 06:51 AM    NEUTROABS 4 50 06/14/2017 07:14 PM    NEUTROABS 9 63 (H) 02/24/2014 06:51 AM    LYMPHSABS 3 93 06/14/2017 07:14 PM    LYMPHSABS 2 41 02/24/2014 06:51 AM    MONOSABS 0 73 06/14/2017 07:14 PM    MONOSABS 1 07 02/24/2014 06:51 AM    EOSABS 0 82 (H) 06/14/2017 07:14 PM    EOSABS 0 13 02/24/2014 06:51 AM     Chemistry Profile:   Lab Results   Component Value Date/Time     06/14/2017 07:14 PM     (L) 02/26/2014 11:33 AM    K 3 5 06/14/2017 07:14 PM    K 4 0 02/26/2014 11:33 AM     06/14/2017 07:14 PM    CL 97 (L) 02/26/2014 11:33 AM    CO2 32 06/14/2017 07:14 PM    CO2 25 02/26/2014 11:33 AM    ANIONGAP 4 06/14/2017 07:14 PM    ANIONGAP 12 02/26/2014 11:33 AM    BUN 12 06/14/2017 07:14 PM    BUN 5 02/26/2014 11:33 AM    CREATININE 1 07 06/14/2017 07:14 PM    CREATININE 0 88 02/26/2014 11:33 AM    GLUCOSE 105 06/14/2017 07:14 PM    GLUCOSE 107 02/26/2014 11:33 AM    CALCIUM 8 8 06/14/2017 07:14 PM    CALCIUM 9 8 02/26/2014 11:33 AM    MG 2 1 02/25/2014 06:28 AM    PHOS 3 6 02/26/2014 11:33 AM    AST 18 02/22/2014 11:37 PM    ALT 26 02/22/2014 11:37 PM    ALKPHOS 84 02/22/2014 11:37 PM    PROT 7 9 02/22/2014 11:37 PM    BILITOT 0 43 02/22/2014 11:37 PM    EGFR >60 0 06/14/2017 07:14 PM       Current Medications     Current Outpatient Prescriptions:     albuterol (PROVENTIL HFA,VENTOLIN HFA) 90 mcg/act inhaler, Inhale 2 puffs every 6 (six) hours as needed for wheezing, Disp: 1 Inhaler, Rfl: 0    acetaminophen (TYLENOL 8 HOUR) 650 mg CR tablet, Take 1 tablet by mouth, Disp: , Rfl:     ibuprofen (MOTRIN) 400 mg tablet, Take by mouth, Disp: , Rfl:     Health Maintenance     Health Maintenance   Topic Date Due    HIV SCREENING  1975    PNEUMOCOCCAL POLYSACCHARIDE VACCINE AGE 2-64 HIGH RISK  10/02/1978    DTaP,Tdap,and Td Vaccines (1 - Tdap) 10/02/1983    Depression Screening PHQ-9  10/02/1988    INFLUENZA VACCINE  08/01/2018 (Originally 9/1/2017)     Immunization History   Administered Date(s) Administered    Tdap 08/24/2016       Elle QUINONEZ   3/1/2018 9:37 AM

## 2018-03-01 NOTE — ASSESSMENT & PLAN NOTE
No systemic signs, afebrile, no fevers/chills, no cough/sore throat    Likely viral, worsened as pt has baseline nasal septal deviation  - Recommend symptomatic support with flonase as needed  - nasal saline spray for intermittent nasal dryness  - Pt advised to return for follow-up if symptoms worsen or patient develops systemic symptoms  - No need for ENT eval at this time for septal deviation as pt states normally he does not have an issue with breathing

## 2018-03-27 ENCOUNTER — OFFICE VISIT (OUTPATIENT)
Dept: INTERNAL MEDICINE CLINIC | Facility: CLINIC | Age: 43
End: 2018-03-27
Payer: COMMERCIAL

## 2018-03-27 VITALS
TEMPERATURE: 98 F | DIASTOLIC BLOOD PRESSURE: 52 MMHG | HEIGHT: 69 IN | BODY MASS INDEX: 26.78 KG/M2 | WEIGHT: 180.78 LBS | SYSTOLIC BLOOD PRESSURE: 114 MMHG | HEART RATE: 76 BPM

## 2018-03-27 DIAGNOSIS — L73.9 FOLLICULITIS: Primary | ICD-10-CM

## 2018-03-27 PROCEDURE — 3725F SCREEN DEPRESSION PERFORMED: CPT | Performed by: INTERNAL MEDICINE

## 2018-03-27 PROCEDURE — 99213 OFFICE O/P EST LOW 20 MIN: CPT | Performed by: INTERNAL MEDICINE

## 2018-03-27 NOTE — PROGRESS NOTES
Assessment/Plan   Diagnoses and all orders for this visit:    Folliculitis    Plan:   1  Folliculitis of bilateral under arms- Advised to use warm compress and avoid using new soap/deodorant that may have irritated this  No fevers/chills at this time- no abx needed  No constitutional symptoms to suggest lymphoma  No lymphadenopathy noted  No high risk behavior to suggest immunocompromised state  If does not improve in next 5-7 days, may need I&D of right armpit lesion  Please call: in 5-10 days if symptoms do not improve and may need I&D and/or abx if has fevers/chills  Follow-up: as needed    Subjective   Patient ID: Josue Kussmaul  is a 40 y o  male  CC: Bumps under arms bilaterally    History of Present Illness:  Lesions, painful with pruritis under bilateral armpits  Noticed last Friday  Started using new deodorant/body soap  Also had a pimple of right armpit that he popped a few days ago  Lesions have not expressed any pus and have no swelling/redness  He has had these years ago in the past and warm compress resolved them  He denies family history of lymphoma, fevers, night sweats, weight loss, IVDA, high risk sexual behavior  Review of Systems  Constitutional: Negative for appetite change  Negative for activity change, fatigue and unexpected weight change  No fevers/chills, no night sweats  Respiratory: Negative for shortness of breath, wheezing, cough  Gastrointestinal: Negative for abdominal pain, nausea and vomiting  Negative for diarrhea or constipation  Negative for blood in stool  Neurological: Negative for dizziness, light-headedness and headaches  Objective   Vitals:    03/27/18 1050   BP: 114/52   Pulse: 76   Temp: 98 °F (36 7 °C)        Physical Exam  GEN: AAOx3, NAD  HEENT: PEERLA, EOMI, MM moist  No lymphadenopathy cervical/clavicular/sub-mandibular/axillary     Skin: Folliculitis- lesion in right armpit, tender to palpation, sub-dermal, one smaller in left armpit as well, no surrounding erythema  Psych: Normal mood and affect      Current Outpatient Prescriptions:     albuterol (PROVENTIL HFA,VENTOLIN HFA) 90 mcg/act inhaler, Inhale 2 puffs every 6 (six) hours as needed for wheezing, Disp: 1 Inhaler, Rfl: 0    fluticasone (FLONASE) 50 mcg/act nasal spray, 1 spray into each nostril daily, Disp: 16 g, Rfl: 0    sodium chloride (OCEAN) 0 65 % nasal spray, 1 spray into each nostril as needed for congestion, Disp: 15 mL, Rfl: 0    acetaminophen (TYLENOL 8 HOUR) 650 mg CR tablet, Take 1 tablet by mouth, Disp: , Rfl:     ibuprofen (MOTRIN) 400 mg tablet, Take by mouth, Disp: , Rfl:       Rosetta Peabody, DO [address] Internal Medicine PGY-2    [facility]  [address] [facility]  Reji Ng, [address] [facility]  [phone]

## 2018-05-11 ENCOUNTER — OFFICE VISIT (OUTPATIENT)
Dept: INTERNAL MEDICINE CLINIC | Facility: CLINIC | Age: 43
End: 2018-05-11
Payer: COMMERCIAL

## 2018-05-11 VITALS
HEIGHT: 69 IN | HEART RATE: 76 BPM | WEIGHT: 189.15 LBS | DIASTOLIC BLOOD PRESSURE: 58 MMHG | SYSTOLIC BLOOD PRESSURE: 84 MMHG | BODY MASS INDEX: 28.02 KG/M2 | TEMPERATURE: 97.5 F

## 2018-05-11 DIAGNOSIS — J02.9 SORE THROAT: Primary | ICD-10-CM

## 2018-05-11 PROCEDURE — 99213 OFFICE O/P EST LOW 20 MIN: CPT | Performed by: INTERNAL MEDICINE

## 2018-05-11 NOTE — PROGRESS NOTES
INTERNAL MEDICINE FOLLOW-UP OFFICE VISIT  [facility]  [address] [place] [address] [place] [address]    NAME: Jose Ball  AGE: 40 y o  SEX: male    DATE OF ENCOUNTER: 5/11/2018    Assessment and Plan     Problem List Items Addressed This Visit        Other    Sore throat - Primary     Centor criteria 0, risk of GABHS pharyngitis very low  Likely viral URI with allergic component  Recommend continue supportive care with OTC cough suppressant prn, tylenol prn, hydration with with rest   No antibiotics indicated at this time  Patient can continue using flonase and claritin prn as well  Advised patient that if his symptoms worsen or he definitely high fever he should call the office for reeval   Patient agreeable to plan               No orders of the defined types were placed in this encounter       - Counseling Documentation: patient was counseled regarding: diagnostic results, instructions for management, risk factor reductions, prognosis, patient and family education, impressions, risks and benefits of treatment options and importance of compliance with treatment  - Counseling Time: counseling time more than 50% of visit: 15 minutes  - Barriers to treatment: None  - Medication Side Effects: Adverse side effects of medications were reviewed with the patient/guardian today  - Self Referrals: No    Chief Complaint     Chief Complaint   Patient presents with    Pain     throat pain/itchiness        History of Present Illness     Sore Throat    This is a new problem  The current episode started yesterday  The problem has been unchanged  Neither side of throat is experiencing more pain than the other  There has been no fever  The pain is at a severity of 2/10  The pain is mild  Associated symptoms include congestion and coughing  Pertinent negatives include no abdominal pain, diarrhea, ear pain, headaches, shortness of breath or vomiting  He has had exposure to strep   Exposure to: [place] niece whom he lives with recently dx with strep throat though she has fevers  Unsure if she was formally tested  He has tried nothing for the symptoms  The following portions of the patient's history were reviewed and updated as appropriate: allergies, current medications, past family history, past medical history, past social history, past surgical history and problem list     Review of Systems     Review of Systems   Constitutional: Negative for activity change, appetite change, chills, fatigue and fever  HENT: Positive for congestion, postnasal drip, rhinorrhea, sneezing and sore throat  Negative for ear pain, sinus pain and voice change  Eyes: Negative  Respiratory: Positive for cough  Negative for shortness of breath and wheezing  Cardiovascular: Negative for chest pain, palpitations and leg swelling  Gastrointestinal: Negative for abdominal pain, constipation, diarrhea, nausea and vomiting  Endocrine: Negative  Genitourinary: Negative  Musculoskeletal: Negative for arthralgias  Skin: Negative  Allergic/Immunologic: Positive for environmental allergies  Neurological: Negative for dizziness, tremors, seizures, weakness, light-headedness and headaches  Hematological: Negative  Psychiatric/Behavioral: Negative  Active Problem List     Patient Active Problem List   Diagnosis    Back pain    Small bowel obstruction due to adhesions (HCC)    Dependence on nicotine from cigarettes    Nasal congestion    Nasal septal deviation    Folliculitis    Sore throat       Objective     BP (!) 84/58   Pulse 76   Temp 97 5 °F (36 4 °C)   Ht 5' 9" (1 753 m)   Wt 85 8 kg (189 lb 2 5 oz)   BMI 27 93 kg/m²     Physical Exam   Constitutional: He is oriented to person, place, and time  He appears well-developed and well-nourished  HENT:   Head: Normocephalic and atraumatic  Mild oropharyngeal erythema    No pharyngeal exudate noted   Eyes: Pupils are equal, round, and reactive to light  Neck: Normal range of motion  Cardiovascular: Normal rate and regular rhythm  Exam reveals no gallop and no friction rub  No murmur heard  Pulmonary/Chest: Effort normal  No respiratory distress  He has no wheezes  Abdominal: Soft  He exhibits no distension  There is no tenderness  Musculoskeletal: He exhibits no edema  Neurological: He is alert and oriented to person, place, and time  Skin: Skin is warm and dry  Vitals reviewed        Pertinent Laboratory/Diagnostic Studies:  CBC:   Lab Results   Component Value Date/Time    WBC 10 09 06/14/2017 07:14 PM    WBC 11 49 (H) 02/26/2014 11:33 AM    RBC 4 77 06/14/2017 07:14 PM    RBC 5 25 02/26/2014 11:33 AM    HGB 14 8 06/14/2017 07:14 PM    HGB 15 9 02/26/2014 11:33 AM    HCT 42 7 06/14/2017 07:14 PM    HCT 46 5 02/26/2014 11:33 AM    MCV 90 06/14/2017 07:14 PM    MCV 89 02/26/2014 11:33 AM    MCH 31 0 06/14/2017 07:14 PM    MCH 30 3 02/26/2014 11:33 AM    MCHC 34 7 06/14/2017 07:14 PM    MCHC 34 2 02/26/2014 11:33 AM    RDW 13 0 06/14/2017 07:14 PM    RDW 12 2 02/26/2014 11:33 AM    MPV 10 7 06/14/2017 07:14 PM    MPV 10 4 02/26/2014 11:33 AM     06/14/2017 07:14 PM     02/26/2014 11:33 AM    NRBC 0 06/14/2017 07:14 PM    NEUTOPHILPCT 45 06/14/2017 07:14 PM    NEUTOPHILPCT 72 02/24/2014 06:51 AM    LYMPHOPCT 39 06/14/2017 07:14 PM    LYMPHOPCT 18 02/24/2014 06:51 AM    MONOPCT 7 06/14/2017 07:14 PM    MONOPCT 8 02/24/2014 06:51 AM    EOSPCT 8 (H) 06/14/2017 07:14 PM    EOSPCT 1 02/24/2014 06:51 AM    BASOPCT 1 06/14/2017 07:14 PM    BASOPCT 1 02/24/2014 06:51 AM    NEUTROABS 4 50 06/14/2017 07:14 PM    NEUTROABS 9 63 (H) 02/24/2014 06:51 AM    LYMPHSABS 3 93 06/14/2017 07:14 PM    LYMPHSABS 2 41 02/24/2014 06:51 AM    MONOSABS 0 73 06/14/2017 07:14 PM    MONOSABS 1 07 02/24/2014 06:51 AM    EOSABS 0 82 (H) 06/14/2017 07:14 PM    EOSABS 0 13 02/24/2014 06:51 AM     Chemistry Profile:   Lab Results   Component Value Date/Time     06/14/2017 07:14 PM     (L) 02/26/2014 11:33 AM    K 3 5 06/14/2017 07:14 PM    K 4 0 02/26/2014 11:33 AM     06/14/2017 07:14 PM    CL 97 (L) 02/26/2014 11:33 AM    CO2 32 06/14/2017 07:14 PM    CO2 25 02/26/2014 11:33 AM    ANIONGAP 4 06/14/2017 07:14 PM    ANIONGAP 12 02/26/2014 11:33 AM    BUN 12 06/14/2017 07:14 PM    BUN 5 02/26/2014 11:33 AM    CREATININE 1 07 06/14/2017 07:14 PM    CREATININE 0 88 02/26/2014 11:33 AM    GLUCOSE 105 06/14/2017 07:14 PM    GLUCOSE 107 02/26/2014 11:33 AM    CALCIUM 8 8 06/14/2017 07:14 PM    CALCIUM 9 8 02/26/2014 11:33 AM    MG 2 1 02/25/2014 06:28 AM    PHOS 3 6 02/26/2014 11:33 AM    AST 18 02/22/2014 11:37 PM    ALT 26 02/22/2014 11:37 PM    ALKPHOS 84 02/22/2014 11:37 PM    PROT 7 9 02/22/2014 11:37 PM    BILITOT 0 43 02/22/2014 11:37 PM    EGFR >60 0 06/14/2017 07:14 PM     Coagulation Studies: No results found for: PROTIME, INR, PTT  Cardiac Studies:   Lab Results   Component Value Date/Time    POCTROP 0 01 06/14/2017 10:23 PM         Current Medications     Current Outpatient Prescriptions:     albuterol (PROVENTIL HFA,VENTOLIN HFA) 90 mcg/act inhaler, Inhale 2 puffs every 6 (six) hours as needed for wheezing, Disp: 1 Inhaler, Rfl: 0    sodium chloride (OCEAN) 0 65 % nasal spray, 1 spray into each nostril as needed for congestion, Disp: 15 mL, Rfl: 0    acetaminophen (TYLENOL 8 HOUR) 650 mg CR tablet, Take 1 tablet by mouth, Disp: , Rfl:     fluticasone (FLONASE) 50 mcg/act nasal spray, 1 spray into each nostril daily, Disp: 16 g, Rfl: 0    ibuprofen (MOTRIN) 400 mg tablet, Take by mouth, Disp: , Rfl:     Health Maintenance     Health Maintenance   Topic Date Due    HIV SCREENING  1976    PNEUMOCOCCAL POLYSACCHARIDE VACCINE AGE 2-64 HIGH RISK  10/02/1978    INFLUENZA VACCINE  09/01/2018    Depression Screening PHQ-9  03/27/2019    DTaP,Tdap,and Td Vaccines (2 - Td) 08/24/2026     Immunization History   Administered Date(s) Administered    Tdap 08/24/2016       Maryana Reynaga Cardio  5/11/2018 11:36 AM

## 2018-05-11 NOTE — ASSESSMENT & PLAN NOTE
Centor criteria 0, risk of GABHS pharyngitis very low  Likely viral URI with allergic component  Recommend continue supportive care with OTC cough suppressant prn, tylenol prn, hydration with with rest   No antibiotics indicated at this time  Patient can continue using flonase and claritin prn as well    Advised patient that if his symptoms worsen or he definitely high fever he should call the office for reeval   Patient agreeable to plan

## 2018-06-07 ENCOUNTER — OFFICE VISIT (OUTPATIENT)
Dept: INTERNAL MEDICINE CLINIC | Facility: CLINIC | Age: 43
End: 2018-06-07
Payer: COMMERCIAL

## 2018-06-07 VITALS
DIASTOLIC BLOOD PRESSURE: 60 MMHG | WEIGHT: 189.15 LBS | SYSTOLIC BLOOD PRESSURE: 100 MMHG | BODY MASS INDEX: 28.02 KG/M2 | HEART RATE: 60 BPM | TEMPERATURE: 98.2 F | HEIGHT: 69 IN

## 2018-06-07 DIAGNOSIS — B35.3 ATHLETE'S FOOT ON RIGHT: Primary | ICD-10-CM

## 2018-06-07 PROCEDURE — 99212 OFFICE O/P EST SF 10 MIN: CPT | Performed by: INTERNAL MEDICINE

## 2018-06-07 RX ORDER — PRENATAL VIT 91/IRON/FOLIC/DHA 28-975-200
COMBINATION PACKAGE (EA) ORAL 2 TIMES DAILY
Qty: 30 G | Refills: 0 | Status: SHIPPED | OUTPATIENT
Start: 2018-06-07 | End: 2018-08-20 | Stop reason: ALTCHOICE

## 2018-06-07 NOTE — PROGRESS NOTES
INTERNAL MEDICINE FOLLOW-UP OFFICE VISIT  [facility]  [address] [facility] [place], [address]    NAME: Alexus Delvalle  AGE: 40 y o  SEX: male    DATE OF ENCOUNTER: 6/7/2018    Assessment and Plan     Problem List Items Addressed This Visit        Musculoskeletal and Integument    Athlete's foot on right - Primary     Patient will be started on terbinafine b i d  the, patient was instructed to keep his feet dry after shower and get different pair of shoes          Relevant Medications    terbinafine (LamISIL) 1 % cream          No orders of the defined types were placed in this encounter       - Counseling Documentation: patient was counseled regarding: diagnostic results, instructions for management, risk factor reductions, prognosis, patient and family education, impressions, risks and benefits of treatment options and importance of compliance with treatment  - Medication Side Effects: Adverse side effects of medications were reviewed with the patient/guardian today  Chief Complaint     Chief Complaint   Patient presents with    Recurrent Skin Infections     pt "possible; med not working' itchy"       History of Present Illness     HPI  71-year-old male patient with no significant past medical history, the patient is currently complaining about itchiness and scaling on his right foot, states he had athlete's foot on the left side that was treated with topical antifungals, this time the patient was started on clotrimazole, the symptoms initially improved but later on the symptoms returned with more itchiness, currently the patient is complaining about itchiness and scaling on his right foot with desquamation in the interdigital space      The following portions of the patient's history were reviewed and updated as appropriate: allergies, current medications, past family history, past medical history, past social history, past surgical history and problem list     Review of Systems     Review of Systems   Constitutional: Negative  Negative for chills and fever  HENT: Negative  Eyes: Negative  Respiratory: Negative  Negative for cough and shortness of breath  Cardiovascular: Negative  Negative for chest pain  Gastrointestinal: Negative  Negative for abdominal pain  Endocrine: Negative  Genitourinary: Negative  Musculoskeletal: Negative  Skin:        Itchiness on his R foot   Allergic/Immunologic: Negative  Neurological: Negative  Negative for weakness  Hematological: Negative  Psychiatric/Behavioral: Negative  All other systems reviewed and are negative  Active Problem List     Patient Active Problem List   Diagnosis    Back pain    Small bowel obstruction due to adhesions (HCC)    Dependence on nicotine from cigarettes    Nasal congestion    Nasal septal deviation    Folliculitis    Sore throat    Athlete's foot on right       Objective     /60 (BP Location: Right arm, Patient Position: Sitting, Cuff Size: Adult)   Pulse 60   Temp 98 2 °F (36 8 °C) (Oral)   Ht 5' 9" (1 753 m)   Wt 85 8 kg (189 lb 2 5 oz)   BMI 27 93 kg/m²     Physical Exam   Constitutional: He is oriented to person, place, and time  He appears well-developed and well-nourished  HENT:   Head: Normocephalic and atraumatic  Mouth/Throat: Oropharynx is clear and moist    Eyes: EOM are normal  Pupils are equal, round, and reactive to light  Neck: Normal range of motion  Neck supple  Cardiovascular: Normal rate and regular rhythm  Pulmonary/Chest: Effort normal and breath sounds normal    Abdominal: Soft  Bowel sounds are normal  He exhibits no mass  There is no tenderness  There is no rebound, no guarding and no CVA tenderness  Musculoskeletal:        Feet:    Neurological: He is alert and oriented to person, place, and time  Skin: Skin is warm and dry  Psychiatric: He has a normal mood and affect   His behavior is normal    Nursing note and vitals reviewed        Pertinent Laboratory/Diagnostic Studies:  CBC:   Lab Results   Component Value Date/Time    WBC 10 09 06/14/2017 07:14 PM    WBC 11 49 (H) 02/26/2014 11:33 AM    RBC 4 77 06/14/2017 07:14 PM    RBC 5 25 02/26/2014 11:33 AM    HGB 14 8 06/14/2017 07:14 PM    HGB 15 9 02/26/2014 11:33 AM    HCT 42 7 06/14/2017 07:14 PM    HCT 46 5 02/26/2014 11:33 AM    MCV 90 06/14/2017 07:14 PM    MCV 89 02/26/2014 11:33 AM    MCH 31 0 06/14/2017 07:14 PM    MCH 30 3 02/26/2014 11:33 AM    MCHC 34 7 06/14/2017 07:14 PM    MCHC 34 2 02/26/2014 11:33 AM    RDW 13 0 06/14/2017 07:14 PM    RDW 12 2 02/26/2014 11:33 AM    MPV 10 7 06/14/2017 07:14 PM    MPV 10 4 02/26/2014 11:33 AM     06/14/2017 07:14 PM     02/26/2014 11:33 AM    NRBC 0 06/14/2017 07:14 PM    NEUTOPHILPCT 45 06/14/2017 07:14 PM    NEUTOPHILPCT 72 02/24/2014 06:51 AM    LYMPHOPCT 39 06/14/2017 07:14 PM    LYMPHOPCT 18 02/24/2014 06:51 AM    MONOPCT 7 06/14/2017 07:14 PM    MONOPCT 8 02/24/2014 06:51 AM    EOSPCT 8 (H) 06/14/2017 07:14 PM    EOSPCT 1 02/24/2014 06:51 AM    BASOPCT 1 06/14/2017 07:14 PM    BASOPCT 1 02/24/2014 06:51 AM    NEUTROABS 4 50 06/14/2017 07:14 PM    NEUTROABS 9 63 (H) 02/24/2014 06:51 AM    LYMPHSABS 3 93 06/14/2017 07:14 PM    LYMPHSABS 2 41 02/24/2014 06:51 AM    MONOSABS 0 73 06/14/2017 07:14 PM    MONOSABS 1 07 02/24/2014 06:51 AM    EOSABS 0 82 (H) 06/14/2017 07:14 PM    EOSABS 0 13 02/24/2014 06:51 AM     Chemistry Profile:   Lab Results   Component Value Date/Time     06/14/2017 07:14 PM     (L) 02/26/2014 11:33 AM    K 3 5 06/14/2017 07:14 PM    K 4 0 02/26/2014 11:33 AM     06/14/2017 07:14 PM    CL 97 (L) 02/26/2014 11:33 AM    CO2 32 06/14/2017 07:14 PM    CO2 25 02/26/2014 11:33 AM    ANIONGAP 4 06/14/2017 07:14 PM    ANIONGAP 12 02/26/2014 11:33 AM    BUN 12 06/14/2017 07:14 PM    BUN 5 02/26/2014 11:33 AM    CREATININE 1 07 06/14/2017 07:14 PM    CREATININE 0 88 02/26/2014 11:33 AM    GLUCOSE 105 06/14/2017 07:14 PM    GLUCOSE 107 02/26/2014 11:33 AM    CALCIUM 8 8 06/14/2017 07:14 PM    CALCIUM 9 8 02/26/2014 11:33 AM    MG 2 1 02/25/2014 06:28 AM    PHOS 3 6 02/26/2014 11:33 AM    AST 18 02/22/2014 11:37 PM    ALT 26 02/22/2014 11:37 PM    ALKPHOS 84 02/22/2014 11:37 PM    PROT 7 9 02/22/2014 11:37 PM    BILITOT 0 43 02/22/2014 11:37 PM    EGFR >60 0 06/14/2017 07:14 PM     CBC:     Chemistry Profile:       Current Medications     Current Outpatient Prescriptions:     albuterol (PROVENTIL HFA,VENTOLIN HFA) 90 mcg/act inhaler, Inhale 2 puffs every 6 (six) hours as needed for wheezing, Disp: 1 Inhaler, Rfl: 0    fluticasone (FLONASE) 50 mcg/act nasal spray, 1 spray into each nostril daily, Disp: 16 g, Rfl: 0    sodium chloride (OCEAN) 0 65 % nasal spray, 1 spray into each nostril as needed for congestion, Disp: 15 mL, Rfl: 0    acetaminophen (TYLENOL 8 HOUR) 650 mg CR tablet, Take 1 tablet by mouth, Disp: , Rfl:     ibuprofen (MOTRIN) 400 mg tablet, Take by mouth, Disp: , Rfl:     terbinafine (LamISIL) 1 % cream, Apply topically 2 (two) times a day, Disp: 30 g, Rfl: 0    Health Maintenance     Health Maintenance   Topic Date Due    HIV SCREENING  1976    PNEUMOCOCCAL POLYSACCHARIDE VACCINE AGE 2-64 HIGH RISK  10/02/1978    INFLUENZA VACCINE  09/01/2018    Depression Screening PHQ-9  03/27/2019    DTaP,Tdap,and Td Vaccines (2 - Td) 08/24/2026     Immunization History   Administered Date(s) Administered    Tdap 08/24/2016       Frida Nuñez MD  Internal Medicine PGY-3  6/7/2018 11:01 AM

## 2018-06-07 NOTE — ASSESSMENT & PLAN NOTE
Patient will be started on terbinafine b i d  the, patient was instructed to keep his feet dry after shower and get different pair of shoes

## 2018-08-20 ENCOUNTER — OFFICE VISIT (OUTPATIENT)
Dept: INTERNAL MEDICINE CLINIC | Facility: CLINIC | Age: 43
End: 2018-08-20
Payer: COMMERCIAL

## 2018-08-20 VITALS
DIASTOLIC BLOOD PRESSURE: 72 MMHG | HEIGHT: 69 IN | SYSTOLIC BLOOD PRESSURE: 108 MMHG | HEART RATE: 72 BPM | WEIGHT: 186.95 LBS | TEMPERATURE: 97.7 F | BODY MASS INDEX: 27.69 KG/M2

## 2018-08-20 DIAGNOSIS — B35.3 TINEA PEDIS: Primary | ICD-10-CM

## 2018-08-20 PROCEDURE — 99213 OFFICE O/P EST LOW 20 MIN: CPT | Performed by: INTERNAL MEDICINE

## 2018-08-20 RX ORDER — CLOTRIMAZOLE 1 %
CREAM (GRAM) TOPICAL 2 TIMES DAILY
Qty: 30 G | Refills: 0 | Status: SHIPPED | OUTPATIENT
Start: 2018-08-20 | End: 2019-02-09

## 2018-08-20 NOTE — PROGRESS NOTES
INTERNAL MEDICINE FOLLOW-UP OFFICE VISIT  [facility]  [address] [facility] [place], [address]    NAME: Nuria Pena  AGE: 40 y o  SEX: male    DATE OF ENCOUNTER: 8/20/2018    Assessment and Plan     Problem List Items Addressed This Visit     None      Visit Diagnoses     Tinea pedis    -  Primary    Relevant Medications    clotrimazole (LOTRIMIN) 1 % cream for 2-4 weeks applied b i d   Patient counseled on where cotton socks/keeping shoes and socks dry at all times  Return precautions discussed patient          No orders of the defined types were placed in this encounter       - Counseling Documentation: patient was counseled regarding: diagnostic results, instructions for management, risk factor reductions, prognosis, patient and family education, impressions, risks and benefits of treatment options and importance of compliance with treatment  - Counseling Time: not applicable  - Barriers to treatment: None  - Medication Side Effects: Adverse side effects of medications were reviewed with the patient/guardian today  - Self Referrals: No    Chief Complaint     Chief Complaint   Patient presents with    Rash     on right foot getting worse       History of Present Illness     Mr Betsy Jorgensen a 67-year-old female who was recently seen in clinic on June 7th for right foot for rash  Was diagnosed with tinea pedis at times started on terbinafine 1% cream   Patient states he was unable to obtain the screen as his insurance would not cover though he was able to receive an "equivalent" cream that was over-the-counter  He is unclear as to the name of this cream   Patient states he has been using the cream daily has noticed mild improvement in the rash is still present  Denies any fevers or chills  Denies any lower extremity cellulitis  The drainage      Rash   This is a new problem  The current episode started more than 1 month ago   The problem has been gradually worsening since onset  The affected locations include the right foot  The rash is characterized by dryness, itchiness, peeling, redness and scaling  He was exposed to nothing  Pertinent negatives include no diarrhea, fatigue, fever, nail changes, rhinorrhea, shortness of breath or vomiting  Treatments tried: Terbinafine cream  The treatment provided mild relief  His past medical history is significant for allergies  The following portions of the patient's history were reviewed and updated as appropriate: allergies, current medications, past family history, past medical history, past social history, past surgical history and problem list     Review of Systems     Review of Systems   Constitutional: Negative for fatigue and fever  HENT: Negative for rhinorrhea  Respiratory: Negative for shortness of breath  Gastrointestinal: Negative for diarrhea and vomiting  Skin: Positive for rash  Negative for nail changes  Active Problem List     Patient Active Problem List   Diagnosis    Back pain    Small bowel obstruction due to adhesions (HCC)    Dependence on nicotine from cigarettes    Nasal congestion    Nasal septal deviation    Folliculitis    Sore throat    Tinea pedis of right foot       Objective     /72   Pulse 72   Temp 97 7 °F (36 5 °C)   Ht 5' 9" (1 753 m)   Wt 84 8 kg (186 lb 15 2 oz)   BMI 27 61 kg/m²     Physical Exam   Constitutional: He is oriented to person, place, and time  He appears well-developed and well-nourished  HENT:   Head: Normocephalic and atraumatic  Eyes: Pupils are equal, round, and reactive to light  Neck: Normal range of motion  Cardiovascular: Normal rate and regular rhythm  Exam reveals no gallop and no friction rub  No murmur heard  Pulmonary/Chest: Effort normal  No respiratory distress  He has no wheezes  Abdominal: Soft  Bowel sounds are normal  He exhibits no distension  There is no tenderness  Musculoskeletal: Normal range of motion   He exhibits no edema  Neurological: He is alert and oriented to person, place, and time  Skin: Skin is warm and dry  Rash (Right foot interdigital (2nd and 3rd digit) scaling with fissure  No overlying erythema suggestive of cellulitis  No drainage) noted  Vitals reviewed        Pertinent Laboratory/Diagnostic Studies:  CBC:   Lab Results   Component Value Date/Time    WBC 10 09 06/14/2017 07:14 PM    WBC 11 49 (H) 02/26/2014 11:33 AM    RBC 4 77 06/14/2017 07:14 PM    RBC 5 25 02/26/2014 11:33 AM    HGB 14 8 06/14/2017 07:14 PM    HGB 15 9 02/26/2014 11:33 AM    HCT 42 7 06/14/2017 07:14 PM    HCT 46 5 02/26/2014 11:33 AM    MCV 90 06/14/2017 07:14 PM    MCV 89 02/26/2014 11:33 AM    MCH 31 0 06/14/2017 07:14 PM    MCH 30 3 02/26/2014 11:33 AM    MCHC 34 7 06/14/2017 07:14 PM    MCHC 34 2 02/26/2014 11:33 AM    RDW 13 0 06/14/2017 07:14 PM    RDW 12 2 02/26/2014 11:33 AM    MPV 10 7 06/14/2017 07:14 PM    MPV 10 4 02/26/2014 11:33 AM     06/14/2017 07:14 PM     02/26/2014 11:33 AM    NRBC 0 06/14/2017 07:14 PM    NEUTOPHILPCT 45 06/14/2017 07:14 PM    NEUTOPHILPCT 72 02/24/2014 06:51 AM    LYMPHOPCT 39 06/14/2017 07:14 PM    LYMPHOPCT 18 02/24/2014 06:51 AM    MONOPCT 7 06/14/2017 07:14 PM    MONOPCT 8 02/24/2014 06:51 AM    EOSPCT 8 (H) 06/14/2017 07:14 PM    EOSPCT 1 02/24/2014 06:51 AM    BASOPCT 1 06/14/2017 07:14 PM    BASOPCT 1 02/24/2014 06:51 AM    NEUTROABS 4 50 06/14/2017 07:14 PM    NEUTROABS 9 63 (H) 02/24/2014 06:51 AM    LYMPHSABS 3 93 06/14/2017 07:14 PM    LYMPHSABS 2 41 02/24/2014 06:51 AM    MONOSABS 0 73 06/14/2017 07:14 PM    MONOSABS 1 07 02/24/2014 06:51 AM    EOSABS 0 82 (H) 06/14/2017 07:14 PM    EOSABS 0 13 02/24/2014 06:51 AM     Chemistry Profile:   Lab Results   Component Value Date/Time     06/14/2017 07:14 PM     (L) 02/26/2014 11:33 AM    K 3 5 06/14/2017 07:14 PM    K 4 0 02/26/2014 11:33 AM     06/14/2017 07:14 PM    CL 97 (L) 02/26/2014 11:33 AM CO2 32 06/14/2017 07:14 PM    CO2 25 02/26/2014 11:33 AM    ANIONGAP 4 06/14/2017 07:14 PM    ANIONGAP 12 02/26/2014 11:33 AM    BUN 12 06/14/2017 07:14 PM    BUN 5 02/26/2014 11:33 AM    CREATININE 1 07 06/14/2017 07:14 PM    CREATININE 0 88 02/26/2014 11:33 AM    GLUCOSE 105 06/14/2017 07:14 PM    GLUCOSE 107 02/26/2014 11:33 AM    CALCIUM 8 8 06/14/2017 07:14 PM    CALCIUM 9 8 02/26/2014 11:33 AM    MG 2 1 02/25/2014 06:28 AM    PHOS 3 6 02/26/2014 11:33 AM    AST 18 02/22/2014 11:37 PM    ALT 26 02/22/2014 11:37 PM    ALKPHOS 84 02/22/2014 11:37 PM    PROT 7 9 02/22/2014 11:37 PM    BILITOT 0 43 02/22/2014 11:37 PM    EGFR >60 0 06/14/2017 07:14 PM     Coagulation Studies: No results found for: PROTIME, INR, PTT  Cardiac Studies:   Lab Results   Component Value Date/Time    POCTROP 0 01 06/14/2017 10:23 PM         Current Medications     Current Outpatient Prescriptions:     acetaminophen (TYLENOL 8 HOUR) 650 mg CR tablet, Take 1 tablet by mouth, Disp: , Rfl:     albuterol (PROVENTIL HFA,VENTOLIN HFA) 90 mcg/act inhaler, Inhale 2 puffs every 6 (six) hours as needed for wheezing, Disp: 1 Inhaler, Rfl: 0    clotrimazole (LOTRIMIN) 1 % cream, Apply topically 2 (two) times a day, Disp: 30 g, Rfl: 0    fluticasone (FLONASE) 50 mcg/act nasal spray, 1 spray into each nostril daily, Disp: 16 g, Rfl: 0    ibuprofen (MOTRIN) 400 mg tablet, Take by mouth, Disp: , Rfl:     sodium chloride (OCEAN) 0 65 % nasal spray, 1 spray into each nostril as needed for congestion, Disp: 15 mL, Rfl: 0    Health Maintenance     Health Maintenance   Topic Date Due    HIV SCREENING  1976    INFLUENZA VACCINE  09/01/2018    Depression Screening PHQ-9  03/27/2019    DTaP,Tdap,and Td Vaccines (2 - Td) 08/24/2026     Immunization History   Administered Date(s) Administered    Tdap 08/24/2016       Bette Renee Cardio  8/20/2018 11:44 AM

## 2018-08-31 ENCOUNTER — TELEPHONE (OUTPATIENT)
Dept: INTERNAL MEDICINE CLINIC | Facility: CLINIC | Age: 43
End: 2018-08-31

## 2018-08-31 NOTE — TELEPHONE ENCOUNTER
Blue Folder:    Verification of Disability form, from the [address] [place] [address] to be completed and signed  This patient was seen by Dr Trinity Newton  Please review, complete and Sign this form, forward to Clinical group for review and clerical group please fax back to [phone]   Thank you

## 2018-09-04 NOTE — TELEPHONE ENCOUNTER
Dr Anthony Castillo needs to complete the form  I have never seen the patient and am unable to assess mental and physical status that is required on the form  Thank you

## 2018-09-24 ENCOUNTER — OFFICE VISIT (OUTPATIENT)
Dept: INTERNAL MEDICINE CLINIC | Facility: CLINIC | Age: 43
End: 2018-09-24
Payer: COMMERCIAL

## 2018-09-24 VITALS
TEMPERATURE: 97.6 F | HEART RATE: 84 BPM | DIASTOLIC BLOOD PRESSURE: 80 MMHG | WEIGHT: 197.53 LBS | SYSTOLIC BLOOD PRESSURE: 106 MMHG | BODY MASS INDEX: 29.26 KG/M2 | HEIGHT: 69 IN

## 2018-09-24 DIAGNOSIS — J45.40 MODERATE PERSISTENT ASTHMA, UNSPECIFIED WHETHER COMPLICATED: Primary | ICD-10-CM

## 2018-09-24 PROCEDURE — 99213 OFFICE O/P EST LOW 20 MIN: CPT | Performed by: INTERNAL MEDICINE

## 2018-09-24 PROCEDURE — 3008F BODY MASS INDEX DOCD: CPT | Performed by: INTERNAL MEDICINE

## 2018-09-24 RX ORDER — PREDNISONE 50 MG/1
TABLET ORAL DAILY
COMMUNITY
End: 2018-10-30 | Stop reason: ALTCHOICE

## 2018-09-24 NOTE — PROGRESS NOTES
INTERNAL MEDICINE FOLLOW-UP OFFICE VISIT  [facility]  [address] [place] [address]    NAME: Yolanda Greenberg  AGE: 40 y o  SEX: male    DATE OF ENCOUNTER: 9/24/2018    Assessment and Plan     Problem List Items Addressed This Visit        Respiratory    Moderate persistent asthma - Primary - will initiate step-up therapy with a LABA/IBS combined inhaler  Patient has opted to complete course of prednisone as he only has 2 days left  Then afterwards patient is instructed to take Advair 1 puff twice daily and albuterol as needed  Will see patient back in clinic in 1 month after spirometry with post bronchodilator testing is completed and to assess symptoms on step-up regimen    Relevant Medications    fluticasone-salmeterol (ADVAIR) 100-50 mcg/dose inhaler  Other Relevant Orders    Spirometry with post bronchodilator          Orders Placed This Encounter   Procedures    Spirometry with post bronchodilator       - Counseling Documentation: patient was counseled regarding: diagnostic results, instructions for management, risk factor reductions, prognosis, patient and family education, impressions, risks and benefits of treatment options and importance of compliance with treatment  - Counseling Time: counseling time more than 50% of visit: 15 minutes  - Barriers to treatment: None  - Medication Side Effects: Adverse side effects of medications were reviewed with the patient/guardian today  - Self Referrals: No    Chief Complaint     Chief Complaint   Patient presents with    Sore Throat      and itchy        History of Present Illness     Mr Gold Wallace is a 45-year-old male with a history of asthma allergic rhinitis here in clinic today for follow-up  Patient said he was seen at the hospital approximately 1 week ago any statin for acute exacerbation of his asthma  He was put on prednisone 50 mg daily x5 days  He has 2 pills remaining    He was also given fluticasone  Patient reports daily symptoms with frequent nighttime awakenings  He has little relief with his albuterol inhaler  Triggers are mental mostly        The following portions of the patient's history were reviewed and updated as appropriate: allergies, current medications, past family history, past medical history, past social history, past surgical history and problem list     Review of Systems     Review of Systems   Constitutional: Negative for activity change, appetite change, chills, fatigue and fever  HENT: Positive for congestion, postnasal drip, rhinorrhea and sore throat  Eyes: Negative  Respiratory: Positive for cough, shortness of breath and wheezing  Cardiovascular: Negative for chest pain and leg swelling  Gastrointestinal: Negative for abdominal distention, abdominal pain, constipation, diarrhea, nausea and vomiting  Endocrine: Negative  Genitourinary: Negative  Musculoskeletal: Negative  Skin: Negative  Allergic/Immunologic: Negative  Neurological: Negative for dizziness, tremors, seizures, syncope, weakness, light-headedness and headaches  Hematological: Negative  Psychiatric/Behavioral: Negative  Active Problem List     Patient Active Problem List   Diagnosis    Back pain    Small bowel obstruction due to adhesions (HCC)    Dependence on nicotine from cigarettes    Nasal congestion    Nasal septal deviation    Folliculitis    Sore throat    Tinea pedis of right foot    Moderate persistent asthma       Objective     /80   Pulse 84   Temp 97 6 °F (36 4 °C)   Ht 5' 9" (1 753 m)   Wt 89 6 kg (197 lb 8 5 oz)   BMI 29 17 kg/m²     Physical Exam   Constitutional: He is oriented to person, place, and time  He appears well-developed and well-nourished  HENT:   Head: Normocephalic and atraumatic  Eyes: Pupils are equal, round, and reactive to light  Neck: Normal range of motion  No JVD present     Cardiovascular: Normal rate and regular rhythm  Exam reveals no gallop and no friction rub  No murmur heard  Pulmonary/Chest: Effort normal  No respiratory distress  He has no rales  Mild expiratory wheezing   Abdominal: Soft  He exhibits no distension  There is no tenderness  Musculoskeletal: Normal range of motion  He exhibits no edema  Neurological: He is alert and oriented to person, place, and time  Skin: Skin is warm and dry  Vitals reviewed        Pertinent Laboratory/Diagnostic Studies:  CBC:   Lab Results   Component Value Date/Time    WBC 10 09 06/14/2017 07:14 PM    WBC 11 49 (H) 02/26/2014 11:33 AM    RBC 4 77 06/14/2017 07:14 PM    RBC 5 25 02/26/2014 11:33 AM    HGB 14 8 06/14/2017 07:14 PM    HGB 15 9 02/26/2014 11:33 AM    HCT 42 7 06/14/2017 07:14 PM    HCT 46 5 02/26/2014 11:33 AM    MCV 90 06/14/2017 07:14 PM    MCV 89 02/26/2014 11:33 AM    MCH 31 0 06/14/2017 07:14 PM    MCH 30 3 02/26/2014 11:33 AM    MCHC 34 7 06/14/2017 07:14 PM    MCHC 34 2 02/26/2014 11:33 AM    RDW 13 0 06/14/2017 07:14 PM    RDW 12 2 02/26/2014 11:33 AM    MPV 10 7 06/14/2017 07:14 PM    MPV 10 4 02/26/2014 11:33 AM     06/14/2017 07:14 PM     02/26/2014 11:33 AM    NRBC 0 06/14/2017 07:14 PM    NEUTOPHILPCT 45 06/14/2017 07:14 PM    NEUTOPHILPCT 72 02/24/2014 06:51 AM    LYMPHOPCT 39 06/14/2017 07:14 PM    LYMPHOPCT 18 02/24/2014 06:51 AM    MONOPCT 7 06/14/2017 07:14 PM    MONOPCT 8 02/24/2014 06:51 AM    EOSPCT 8 (H) 06/14/2017 07:14 PM    EOSPCT 1 02/24/2014 06:51 AM    BASOPCT 1 06/14/2017 07:14 PM    BASOPCT 1 02/24/2014 06:51 AM    NEUTROABS 4 50 06/14/2017 07:14 PM    NEUTROABS 9 63 (H) 02/24/2014 06:51 AM    LYMPHSABS 3 93 06/14/2017 07:14 PM    LYMPHSABS 2 41 02/24/2014 06:51 AM    MONOSABS 0 73 06/14/2017 07:14 PM    MONOSABS 1 07 02/24/2014 06:51 AM    EOSABS 0 82 (H) 06/14/2017 07:14 PM    EOSABS 0 13 02/24/2014 06:51 AM     Chemistry Profile:   Lab Results   Component Value Date/Time     06/14/2017 07:14 PM     (L) 02/26/2014 11:33 AM    K 3 5 06/14/2017 07:14 PM    K 4 0 02/26/2014 11:33 AM     06/14/2017 07:14 PM    CL 97 (L) 02/26/2014 11:33 AM    CO2 32 06/14/2017 07:14 PM    CO2 25 02/26/2014 11:33 AM    ANIONGAP 12 02/26/2014 11:33 AM    BUN 12 06/14/2017 07:14 PM    BUN 5 02/26/2014 11:33 AM    CREATININE 1 07 06/14/2017 07:14 PM    CREATININE 0 88 02/26/2014 11:33 AM    GLUC 105 06/14/2017 07:14 PM    GLUCOSE 107 02/26/2014 11:33 AM    CALCIUM 8 8 06/14/2017 07:14 PM    CALCIUM 9 8 02/26/2014 11:33 AM    MG 2 1 02/25/2014 06:28 AM    PHOS 3 6 02/26/2014 11:33 AM    AST 18 02/22/2014 11:37 PM    ALT 26 02/22/2014 11:37 PM    ALKPHOS 84 02/22/2014 11:37 PM    PROT 7 9 02/22/2014 11:37 PM    BILITOT 0 43 02/22/2014 11:37 PM    EGFR >60 0 06/14/2017 07:14 PM     Coagulation Studies: No results found for: PROTIME, INR, PTT  Cardiac Studies:   Lab Results   Component Value Date/Time    POCTROP 0 01 06/14/2017 10:23 PM     CBC:     Chemistry Profile:       Invalid input(s): EXTSODIUM, EXTPOTASSIUM, EXTCO2, EXTANIONGAP, EXTBUN, EXTCREAT, EXTGLUBLD, SLAMBGLUCOSE, SERUMALBUMIN, EGFRAA  Coagulation Studies:     Cardiac Studies:       Current Medications     Current Outpatient Prescriptions:     albuterol (PROVENTIL HFA,VENTOLIN HFA) 90 mcg/act inhaler, Inhale 2 puffs every 6 (six) hours as needed for wheezing, Disp: 1 Inhaler, Rfl: 0    ibuprofen (MOTRIN) 400 mg tablet, Take 600 mg by mouth  , Disp: , Rfl:     predniSONE 50 mg tablet, Take by mouth daily, Disp: , Rfl:     acetaminophen (TYLENOL 8 HOUR) 650 mg CR tablet, Take 1 tablet by mouth, Disp: , Rfl:     clotrimazole (LOTRIMIN) 1 % cream, Apply topically 2 (two) times a day (Patient not taking: Reported on 9/24/2018 ), Disp: 30 g, Rfl: 0    fluticasone-salmeterol (ADVAIR) 100-50 mcg/dose inhaler, Inhale 1 puff 2 (two) times a day Rinse mouth after use , Disp: 1 Inhaler, Rfl: 0    sodium chloride (OCEAN) 0 65 % nasal spray, 1 spray into each nostril as needed for congestion (Patient not taking: Reported on 9/24/2018 ), Disp: 15 mL, Rfl: 0    Health Maintenance     Health Maintenance   Topic Date Due    INFLUENZA VACCINE  10/24/2018 (Originally 9/1/2018)    Depression Screening PHQ  03/27/2019    DTaP,Tdap,and Td Vaccines (2 - Td) 08/24/2026     Immunization History   Administered Date(s) Administered    Tdap 08/24/2016       Mason Hart Cardio  9/24/2018 9:10 AM

## 2018-09-25 ENCOUNTER — TELEPHONE (OUTPATIENT)
Dept: INTERNAL MEDICINE CLINIC | Facility: CLINIC | Age: 43
End: 2018-09-25

## 2018-09-28 ENCOUNTER — CLINICAL SUPPORT (OUTPATIENT)
Dept: INTERNAL MEDICINE CLINIC | Facility: CLINIC | Age: 43
End: 2018-09-28
Payer: COMMERCIAL

## 2018-09-28 DIAGNOSIS — J45.909 MILD ASTHMA WITHOUT COMPLICATION, UNSPECIFIED WHETHER PERSISTENT: Primary | ICD-10-CM

## 2018-09-28 DIAGNOSIS — J45.40 MODERATE PERSISTENT ASTHMA, UNSPECIFIED WHETHER COMPLICATED: Primary | ICD-10-CM

## 2018-09-28 RX ORDER — ALBUTEROL SULFATE 2.5 MG/3ML
2.5 SOLUTION RESPIRATORY (INHALATION) ONCE
Status: DISCONTINUED | OUTPATIENT
Start: 2018-09-28 | End: 2018-09-28

## 2018-09-28 RX ORDER — ALBUTEROL SULFATE 2.5 MG/3ML
2.5 SOLUTION RESPIRATORY (INHALATION) ONCE
Status: COMPLETED | OUTPATIENT
Start: 2018-09-28 | End: 2018-09-28

## 2018-09-28 RX ADMIN — ALBUTEROL SULFATE 2.5 MG: 2.5 SOLUTION RESPIRATORY (INHALATION) at 16:33

## 2018-09-28 NOTE — PROGRESS NOTES
Patient was seen today for a nurse visit for a spirometry with bronchodilator ordered by dr Walker Kaba  Performed spirometry patient understood all instructions appears he was not giving his best effort  Will call patient if any further testing is needed

## 2018-10-30 ENCOUNTER — OFFICE VISIT (OUTPATIENT)
Dept: INTERNAL MEDICINE CLINIC | Facility: CLINIC | Age: 43
End: 2018-10-30
Payer: COMMERCIAL

## 2018-10-30 VITALS
BODY MASS INDEX: 28.73 KG/M2 | HEIGHT: 69 IN | SYSTOLIC BLOOD PRESSURE: 110 MMHG | WEIGHT: 194 LBS | HEART RATE: 72 BPM | TEMPERATURE: 97.9 F | DIASTOLIC BLOOD PRESSURE: 76 MMHG

## 2018-10-30 DIAGNOSIS — Z23 NEED FOR PROPHYLACTIC VACCINATION AND INOCULATION AGAINST INFLUENZA: ICD-10-CM

## 2018-10-30 DIAGNOSIS — F17.210 DEPENDENCE ON NICOTINE FROM CIGARETTES: Primary | ICD-10-CM

## 2018-10-30 DIAGNOSIS — J45.40 MODERATE PERSISTENT ASTHMA, UNSPECIFIED WHETHER COMPLICATED: ICD-10-CM

## 2018-10-30 PROBLEM — R09.81 NASAL CONGESTION: Status: RESOLVED | Noted: 2018-03-01 | Resolved: 2018-10-30

## 2018-10-30 PROBLEM — L73.9 FOLLICULITIS: Status: RESOLVED | Noted: 2018-03-27 | Resolved: 2018-10-30

## 2018-10-30 PROBLEM — J02.9 SORE THROAT: Status: RESOLVED | Noted: 2018-05-11 | Resolved: 2018-10-30

## 2018-10-30 PROBLEM — B35.3 TINEA PEDIS OF RIGHT FOOT: Status: RESOLVED | Noted: 2018-06-07 | Resolved: 2018-10-30

## 2018-10-30 PROCEDURE — 90682 RIV4 VACC RECOMBINANT DNA IM: CPT | Performed by: INTERNAL MEDICINE

## 2018-10-30 PROCEDURE — 99213 OFFICE O/P EST LOW 20 MIN: CPT | Performed by: INTERNAL MEDICINE

## 2018-10-30 PROCEDURE — 1036F TOBACCO NON-USER: CPT | Performed by: INTERNAL MEDICINE

## 2018-10-30 PROCEDURE — 3008F BODY MASS INDEX DOCD: CPT | Performed by: INTERNAL MEDICINE

## 2018-10-30 PROCEDURE — 90471 IMMUNIZATION ADMIN: CPT | Performed by: INTERNAL MEDICINE

## 2018-10-30 NOTE — PROGRESS NOTES
INTERNAL MEDICINE FOLLOW-UP OFFICE VISIT  [facility]  [address] [place] [address]    NAME: Kyle Roman  AGE: 44 y o  SEX: male    DATE OF ENCOUNTER: 10/30/2018    Assessment and Plan     Problem List Items Addressed This Visit        Respiratory    Moderate persistent asthma     Currently maintained on Advair 100-50 which was started last visit  Prior patient was only on albuterol HFA inhaler p r n    patient has had improvement in his symptoms since being started on his maintenance inhaler  · Counseled patient on tobacco cessation, patient still currently smoking approximately 2 cigarettes per day  He is currently pre contemplative on quitting, will continue to assess readiness to quit  · Continue Advair 100-50 twice daily with albuterol HFA inhaler p r n    · Flu vaccine administered today         Relevant Medications    fluticasone-salmeterol (ADVAIR) 100-50 mcg/dose inhaler    Other Relevant Orders    Hemoglobin A1C    Comprehensive metabolic panel    CBC and differential       Other    Dependence on nicotine from cigarettes - Primary    Relevant Orders    Hemoglobin A1C    Comprehensive metabolic panel    CBC and differential      Other Visit Diagnoses     Need for prophylactic vaccination and inoculation against influenza        Relevant Orders    influenza vaccine, [id number], quadrivalent, recombinant, PF, 0 5 mL, for patients 18-49 yr with comorbidities ([facility]) (Completed)          Orders Placed This Encounter   Procedures    influenza vaccine, [id number], quadrivalent, recombinant, PF, 0 5 mL, for patients 18-49 yr with comorbidities (FLUBLOK)    Hemoglobin A1C    Comprehensive metabolic panel    CBC and differential       - Counseling Documentation: patient was counseled regarding: diagnostic results, instructions for management, risk factor reductions, prognosis, patient and family education, impressions, risks and benefits of treatment options and importance of compliance with treatment  - Counseling Time: counseling time more than 50% of visit: 15 minutes  - Barriers to treatment: None  - Medication Side Effects: Adverse side effects of medications were reviewed with the patient/guardian today  - Self Referrals: No    Chief Complaint     Chief Complaint   Patient presents with    Follow-up     one month, test results       History of Present Illness     Mr Christiano Baca this 52-year-old male with history of moderate persistent asthma and nicotine dependence here in clinic today    Asthma   There is no cough, shortness of breath or wheezing  This is a chronic problem  The current episode started more than 1 year ago  The problem occurs every several days  The problem has been rapidly improving  Pertinent negatives include no chest pain or headaches  His symptoms are aggravated by change in weather, exercise, exposure to smoke, pollen, URI, strenuous activity and exposure to fumes  His symptoms are alleviated by beta-agonist, oral steroids and steroid inhaler  He reports significant improvement on treatment  Risk factors for lung disease include smoking/tobacco exposure  His past medical history is significant for asthma  The following portions of the patient's history were reviewed and updated as appropriate: allergies, current medications, past family history, past medical history, past social history, past surgical history and problem list     Review of Systems     Review of Systems   Constitutional: Negative for activity change, chills, fatigue and unexpected weight change  HENT: Negative for congestion  Eyes: Negative  Respiratory: Negative for cough, shortness of breath and wheezing  Cardiovascular: Negative for chest pain, palpitations and leg swelling  Gastrointestinal: Negative for abdominal distention, abdominal pain, diarrhea, nausea and vomiting  Endocrine: Negative  Genitourinary: Negative      Musculoskeletal: Positive for arthralgias  Skin: Negative  Allergic/Immunologic: Positive for environmental allergies  Negative for food allergies and immunocompromised state  Neurological: Negative for dizziness, tremors, seizures, syncope, weakness, numbness and headaches  Hematological: Negative  Psychiatric/Behavioral: Negative  Active Problem List     Patient Active Problem List   Diagnosis    Back pain    Dependence on nicotine from cigarettes    Nasal septal deviation    Moderate persistent asthma       Objective     /76   Pulse 72   Temp 97 9 °F (36 6 °C)   Ht 5' 9" (1 753 m)   Wt 88 kg (194 lb 0 1 oz)   BMI 28 65 kg/m²     Physical Exam   Constitutional: He is oriented to person, place, and time  He appears well-developed and well-nourished  No distress  HENT:   Head: Normocephalic and atraumatic  Eyes: Pupils are equal, round, and reactive to light  Neck: Normal range of motion  No JVD present  Cardiovascular: Normal rate and regular rhythm  Exam reveals no gallop and no friction rub  No murmur heard  Pulmonary/Chest: Effort normal  No respiratory distress  He has no wheezes  Abdominal: Soft  Bowel sounds are normal  He exhibits no distension  There is no tenderness  Musculoskeletal: Normal range of motion  He exhibits no edema  Neurological: He is alert and oriented to person, place, and time  No cranial nerve deficit  Skin: Skin is warm and dry  Vitals reviewed        Pertinent Laboratory/Diagnostic Studies:  CBC:   Lab Results   Component Value Date/Time    WBC 10 09 06/14/2017 07:14 PM    WBC 11 49 (H) 02/26/2014 11:33 AM    RBC 4 77 06/14/2017 07:14 PM    RBC 5 25 02/26/2014 11:33 AM    HGB 14 8 06/14/2017 07:14 PM    HGB 15 9 02/26/2014 11:33 AM    HCT 42 7 06/14/2017 07:14 PM    HCT 46 5 02/26/2014 11:33 AM    MCV 90 06/14/2017 07:14 PM    MCV 89 02/26/2014 11:33 AM    MCH 31 0 06/14/2017 07:14 PM    MCH 30 3 02/26/2014 11:33 AM    MCHC 34 7 06/14/2017 07:14 PM MCHC 34 2 02/26/2014 11:33 AM    RDW 13 0 06/14/2017 07:14 PM    RDW 12 2 02/26/2014 11:33 AM    MPV 10 7 06/14/2017 07:14 PM    MPV 10 4 02/26/2014 11:33 AM     06/14/2017 07:14 PM     02/26/2014 11:33 AM    NRBC 0 06/14/2017 07:14 PM    NEUTOPHILPCT 45 06/14/2017 07:14 PM    NEUTOPHILPCT 72 02/24/2014 06:51 AM    LYMPHOPCT 39 06/14/2017 07:14 PM    LYMPHOPCT 18 02/24/2014 06:51 AM    MONOPCT 7 06/14/2017 07:14 PM    MONOPCT 8 02/24/2014 06:51 AM    EOSPCT 8 (H) 06/14/2017 07:14 PM    EOSPCT 1 02/24/2014 06:51 AM    BASOPCT 1 06/14/2017 07:14 PM    BASOPCT 1 02/24/2014 06:51 AM    NEUTROABS 4 50 06/14/2017 07:14 PM    NEUTROABS 9 63 (H) 02/24/2014 06:51 AM    LYMPHSABS 3 93 06/14/2017 07:14 PM    LYMPHSABS 2 41 02/24/2014 06:51 AM    MONOSABS 0 73 06/14/2017 07:14 PM    MONOSABS 1 07 02/24/2014 06:51 AM    EOSABS 0 82 (H) 06/14/2017 07:14 PM    EOSABS 0 13 02/24/2014 06:51 AM     Chemistry Profile:   Lab Results   Component Value Date/Time     06/14/2017 07:14 PM     (L) 02/26/2014 11:33 AM    K 3 5 06/14/2017 07:14 PM    K 4 0 02/26/2014 11:33 AM     06/14/2017 07:14 PM    CL 97 (L) 02/26/2014 11:33 AM    CO2 32 06/14/2017 07:14 PM    CO2 25 02/26/2014 11:33 AM    ANIONGAP 12 02/26/2014 11:33 AM    BUN 12 06/14/2017 07:14 PM    BUN 5 02/26/2014 11:33 AM    CREATININE 1 07 06/14/2017 07:14 PM    CREATININE 0 88 02/26/2014 11:33 AM    GLUC 105 06/14/2017 07:14 PM    GLUCOSE 107 02/26/2014 11:33 AM    CALCIUM 8 8 06/14/2017 07:14 PM    CALCIUM 9 8 02/26/2014 11:33 AM    MG 2 1 02/25/2014 06:28 AM    PHOS 3 6 02/26/2014 11:33 AM    AST 18 02/22/2014 11:37 PM    ALT 26 02/22/2014 11:37 PM    ALKPHOS 84 02/22/2014 11:37 PM    PROT 7 9 02/22/2014 11:37 PM    BILITOT 0 43 02/22/2014 11:37 PM    EGFR >60 0 06/14/2017 07:14 PM     Coagulation Studies: No results found for: PROTIME, INR, PTT  Cardiac Studies:   Lab Results   Component Value Date/Time    POCTROP 0 01 06/14/2017 10:23 PM Current Medications     Current Outpatient Prescriptions:     albuterol (PROVENTIL HFA,VENTOLIN HFA) 90 mcg/act inhaler, Inhale 2 puffs every 6 (six) hours as needed for wheezing, Disp: 1 Inhaler, Rfl: 0    clotrimazole (LOTRIMIN) 1 % cream, Apply topically 2 (two) times a day, Disp: 30 g, Rfl: 0    fluticasone-salmeterol (ADVAIR) 100-50 mcg/dose inhaler, Inhale 1 puff 2 (two) times a day Rinse mouth after use , Disp: 1 Inhaler, Rfl: 0    sodium chloride (OCEAN) 0 65 % nasal spray, 1 spray into each nostril as needed for congestion, Disp: 15 mL, Rfl: 0    acetaminophen (TYLENOL 8 HOUR) 650 mg CR tablet, Take 1 tablet by mouth, Disp: , Rfl:     ibuprofen (MOTRIN) 400 mg tablet, Take 600 mg by mouth  , Disp: , Rfl:     Health Maintenance     Health Maintenance   Topic Date Due    INFLUENZA VACCINE  11/30/2018 (Originally 7/1/2018)    Depression Screening PHQ  03/27/2019    DTaP,Tdap,and Td Vaccines (2 - Td) 08/24/2026     Immunization History   Administered Date(s) Administered    Influenza, recombinant, quadrivalent,injectable, preservative free 10/30/2018    Tdap 08/24/2016       Brady Guajardotch Cardio  10/30/2018 3:05 PM

## 2018-10-30 NOTE — ASSESSMENT & PLAN NOTE
Currently maintained on Advair 100-50 which was started last visit  Prior patient was only on albuterol HFA inhaler p r n    patient has had improvement in his symptoms since being started on his maintenance inhaler  · Counseled patient on tobacco cessation, patient still currently smoking approximately 2 cigarettes per day  He is currently pre contemplative on quitting, will continue to assess readiness to quit  · Continue Advair 100-50 twice daily with albuterol HFA inhaler p r n    · Flu vaccine administered today

## 2019-02-09 ENCOUNTER — HOSPITAL ENCOUNTER (EMERGENCY)
Facility: HOSPITAL | Age: 44
Discharge: HOME | End: 2019-02-09
Attending: EMERGENCY MEDICINE | Admitting: EMERGENCY MEDICINE
Payer: COMMERCIAL

## 2019-02-09 ENCOUNTER — APPOINTMENT (EMERGENCY)
Dept: RADIOLOGY | Facility: HOSPITAL | Age: 44
End: 2019-02-09
Payer: COMMERCIAL

## 2019-02-09 VITALS
TEMPERATURE: 98.6 F | WEIGHT: 191.8 LBS | SYSTOLIC BLOOD PRESSURE: 114 MMHG | HEART RATE: 63 BPM | DIASTOLIC BLOOD PRESSURE: 68 MMHG | OXYGEN SATURATION: 99 % | RESPIRATION RATE: 18 BRPM | BODY MASS INDEX: 28.32 KG/M2

## 2019-02-09 DIAGNOSIS — R07.9 RIGHT-SIDED CHEST PAIN: Primary | ICD-10-CM

## 2019-02-09 DIAGNOSIS — Z72.0 TOBACCO ABUSE: ICD-10-CM

## 2019-02-09 LAB
ALBUMIN SERPL BCP-MCNC: 4.6 G/DL (ref 3–5.2)
ALP SERPL-CCNC: 64 U/L (ref 43–122)
ALT SERPL W P-5'-P-CCNC: 28 U/L (ref 9–52)
ANION GAP SERPL CALCULATED.3IONS-SCNC: 10 MMOL/L (ref 5–14)
AST SERPL W P-5'-P-CCNC: 24 U/L (ref 17–59)
BASOPHILS # BLD AUTO: 0.1 THOUSANDS/ΜL (ref 0–0.1)
BASOPHILS NFR BLD AUTO: 1 % (ref 0–1)
BILIRUB SERPL-MCNC: 0.4 MG/DL
BUN SERPL-MCNC: 17 MG/DL (ref 5–25)
CALCIUM SERPL-MCNC: 9.6 MG/DL (ref 8.4–10.2)
CHLORIDE SERPL-SCNC: 102 MMOL/L (ref 97–108)
CO2 SERPL-SCNC: 28 MMOL/L (ref 22–30)
CREAT SERPL-MCNC: 1.04 MG/DL (ref 0.7–1.5)
D-DIMER: 0.34 MG/L
EOSINOPHIL # BLD AUTO: 0.4 THOUSAND/ΜL (ref 0–0.4)
EOSINOPHIL NFR BLD AUTO: 4 % (ref 0–6)
ERYTHROCYTE [DISTWIDTH] IN BLOOD BY AUTOMATED COUNT: 13 %
GFR SERPL CREATININE-BSD FRML MDRD: 88 ML/MIN/1.73SQ M
GLUCOSE SERPL-MCNC: 98 MG/DL (ref 70–99)
HCT VFR BLD AUTO: 46.4 % (ref 41–53)
HGB BLD-MCNC: 15.2 G/DL (ref 13.5–17.5)
LYMPHOCYTES # BLD AUTO: 3.1 THOUSANDS/ΜL (ref 0.5–4)
LYMPHOCYTES NFR BLD AUTO: 32 % (ref 25–45)
MAGNESIUM SERPL-MCNC: 2.1 MG/DL (ref 1.6–2.3)
MCH RBC QN AUTO: 29.4 PG (ref 26–34)
MCHC RBC AUTO-ENTMCNC: 32.8 G/DL (ref 31–36)
MCV RBC AUTO: 89 FL (ref 80–100)
MONOCYTES # BLD AUTO: 0.7 THOUSAND/ΜL (ref 0.2–0.9)
MONOCYTES NFR BLD AUTO: 7 % (ref 1–10)
NEUTROPHILS # BLD AUTO: 5.4 THOUSANDS/ΜL (ref 1.8–7.8)
NEUTS SEG NFR BLD AUTO: 56 % (ref 45–65)
PLATELET # BLD AUTO: 258 THOUSANDS/UL (ref 150–450)
PMV BLD AUTO: 9.1 FL (ref 8.9–12.7)
POTASSIUM SERPL-SCNC: 3.7 MMOL/L (ref 3.6–5)
PROT SERPL-MCNC: 7.8 G/DL (ref 5.9–8.4)
RBC # BLD AUTO: 5.19 MILLION/UL (ref 4.5–5.9)
SODIUM SERPL-SCNC: 140 MMOL/L (ref 137–147)
TROPONIN I SERPL-MCNC: <0.01 NG/ML (ref 0–0.03)
WBC # BLD AUTO: 9.8 THOUSAND/UL (ref 4.5–11)

## 2019-02-09 PROCEDURE — 96361 HYDRATE IV INFUSION ADD-ON: CPT

## 2019-02-09 PROCEDURE — 71046 X-RAY EXAM CHEST 2 VIEWS: CPT

## 2019-02-09 PROCEDURE — 96374 THER/PROPH/DIAG INJ IV PUSH: CPT

## 2019-02-09 PROCEDURE — 93005 ELECTROCARDIOGRAM TRACING: CPT

## 2019-02-09 PROCEDURE — 85379 FIBRIN DEGRADATION QUANT: CPT | Performed by: EMERGENCY MEDICINE

## 2019-02-09 PROCEDURE — 84484 ASSAY OF TROPONIN QUANT: CPT | Performed by: EMERGENCY MEDICINE

## 2019-02-09 PROCEDURE — 99285 EMERGENCY DEPT VISIT HI MDM: CPT

## 2019-02-09 PROCEDURE — 80053 COMPREHEN METABOLIC PANEL: CPT | Performed by: EMERGENCY MEDICINE

## 2019-02-09 PROCEDURE — 36415 COLL VENOUS BLD VENIPUNCTURE: CPT | Performed by: EMERGENCY MEDICINE

## 2019-02-09 PROCEDURE — 83735 ASSAY OF MAGNESIUM: CPT | Performed by: EMERGENCY MEDICINE

## 2019-02-09 PROCEDURE — 85025 COMPLETE CBC W/AUTO DIFF WBC: CPT | Performed by: EMERGENCY MEDICINE

## 2019-02-09 RX ORDER — ASPIRIN 81 MG/1
324 TABLET, CHEWABLE ORAL ONCE
Status: COMPLETED | OUTPATIENT
Start: 2019-02-09 | End: 2019-02-09

## 2019-02-09 RX ORDER — KETOROLAC TROMETHAMINE 30 MG/ML
30 INJECTION, SOLUTION INTRAMUSCULAR; INTRAVENOUS ONCE
Status: COMPLETED | OUTPATIENT
Start: 2019-02-09 | End: 2019-02-09

## 2019-02-09 RX ORDER — ALBUTEROL SULFATE 90 UG/1
2 AEROSOL, METERED RESPIRATORY (INHALATION) EVERY 6 HOURS PRN
COMMUNITY
End: 2020-02-27 | Stop reason: SDUPTHER

## 2019-02-09 RX ADMIN — KETOROLAC TROMETHAMINE 30 MG: 30 INJECTION, SOLUTION INTRAMUSCULAR; INTRAVENOUS at 20:00

## 2019-02-09 RX ADMIN — SODIUM CHLORIDE 1000 ML: 9 INJECTION, SOLUTION INTRAVENOUS at 19:57

## 2019-02-09 RX ADMIN — ASPIRIN 81 MG 324 MG: 81 TABLET ORAL at 20:00

## 2019-02-09 RX ADMIN — DEXAMETHASONE SODIUM PHOSPHATE 10 MG: 10 INJECTION, SOLUTION INTRAMUSCULAR; INTRAVENOUS at 20:56

## 2019-02-10 LAB
ATRIAL RATE: 66 BPM
P AXIS: 26 DEGREES
PR INTERVAL: 126 MS
QRS AXIS: 35 DEGREES
QRSD INTERVAL: 76 MS
QT INTERVAL: 384 MS
QTC INTERVAL: 402 MS
T WAVE AXIS: 39 DEGREES
VENTRICULAR RATE: 66 BPM

## 2019-02-10 PROCEDURE — 93010 ELECTROCARDIOGRAM REPORT: CPT | Performed by: INTERNAL MEDICINE

## 2019-02-10 NOTE — DISCHARGE INSTRUCTIONS
Chest Pain, Ambulatory Care   GENERAL INFORMATION:   Chest pain  can be caused by a range of conditions, from not serious to life-threatening  It may be caused by a heart attack or a blood clot in your lungs  Sometimes chest pain or pressure is caused by poor blood flow to your heart (angina)  Infection, inflammation, or a fracture in the bones or cartilage in your chest can cause pain or discomfort  Chest pain can also be a symptom of a digestive problem, such as acid reflux or a stomach ulcer  Common symptoms include the following:   · Fever or sweating     · Nausea or vomiting     · Shortness of breath     · Discomfort or pressure that spreads from your chest to your back, jaw, or arm     · A racing or slow heartbeat     · Feeling weak, tired, or faint  Seek immediate care for the following symptoms:   · Any of the following signs of a heart attack:      ¨ Squeezing, pressure, or pain in your chest that lasts longer than 5 minutes or returns    ¨ Discomfort or pain in your back, neck, jaw, stomach, or arm     ¨ Trouble breathing     ¨ Nausea or vomiting    ¨ Lightheadedness or a sudden cold sweat, especially with trouble breathing         · Chest discomfort that gets worse, even with medicine    · Coughing or vomiting blood    · Black or bloody bowel movements     · Vomiting that does not stop, or pain when you swallow  Treatment for chest pain  may include medicine to treat your symptoms while he determines the cause of your chest pain  You may also need any of the following:  · Antiplatelets , such as aspirin, help prevent blood clots  Take your antiplatelet medicine exactly as directed  These medicines make it more likely for you to bleed or bruise  If you are told to take aspirin, do not take acetaminophen or ibuprofen instead  · Prescription pain medicine  may be given  Ask how to take this medicine safely  Do not smoke: If you smoke, it is never too late to quit   Smoking increases your risk for a heart attack and other heart and lung conditions  Ask your healthcare provider for information about how to stop smoking if you need help  Follow up with your healthcare provider as directed: You may need more tests  You may be referred to a specialist, such as a cardiologist or gastroenterologist  Write down your questions so you remember to ask them during your visits  CARE AGREEMENT:   You have the right to help plan your care  Learn about your health condition and how it may be treated  Discuss treatment options with your caregivers to decide what care you want to receive  You always have the right to refuse treatment  The above information is an  only  It is not intended as medical advice for individual conditions or treatments  Talk to your doctor, nurse or pharmacist before following any medical regimen to see if it is safe and effective for you  © 2014 [address] is for End User's use only and may not be sold, redistributed or otherwise used for commercial purposes  All illustrations and images included in CareNotes® are the copyrighted property of A D A M [employer] Inc  or [facility]  Chest Pain   AMBULATORY CARE:   Chest pain  can be caused by a range of conditions, from not serious to life-threatening  It may be caused by a heart attack or a blood clot in your lungs  Sometimes chest pain or pressure is caused by poor blood flow to your heart (angina)  Infection, inflammation, or a fracture in the bones or cartilage in your chest can cause pain or discomfort  Chest pain can also be a symptom of a digestive problem, such as acid reflux or a stomach ulcer  An anxiety attack or a strong emotion such as anger can also cause chest pain  It is important to follow up with your healthcare provider to find the cause of your chest pain    Common symptoms you may have with chest pain:   · Fever or sweating     · Nausea or vomiting     · Shortness of breath · Discomfort or pressure that spreads from your chest to your back, jaw, or arm     · A racing or slow heartbeat     · Feeling weak, tired, or faint  Call 911 if:   · You have any of the following signs of a heart attack:      ¨ Squeezing, pressure, or pain in your chest that lasts longer than 5 minutes or returns    ¨ Discomfort or pain in your back, neck, jaw, stomach, or arm     ¨ Trouble breathing    ¨ Nausea or vomiting    ¨ Lightheadedness or a sudden cold sweat, especially with chest pain or trouble breathing    Seek care immediately if:   · You have chest discomfort that gets worse, even with medicine  · You cough or vomit blood  · Your bowel movements are black or bloody  · You cannot stop vomiting, or it hurts to swallow  Contact your healthcare provider if:   · You have questions or concerns about your condition or care  Treatment for chest pain  may include medicine to treat your symptoms while your healthcare provider finds the cause of your chest pain  · Medicines  may be given to treat the cause of your chest pain  Examples include pain medicine, anxiety medicine, or medicines to increase blood flow to your heart  · Do not take certain medicines without asking your healthcare provider first   These include NSAIDs, herbal or vitamin supplements, or hormones (estrogen or progestin)  Follow up with your healthcare provider within 72 hours, or as directed: You may need to return for more tests to find the cause of your chest pain  You may be referred to a specialist, such as a cardiologist or gastroenterologist  Write down your questions so you remember to ask them during your visits  Healthy living tips: The following are general healthy guidelines  If your chest pain is caused by a heart problem, your healthcare provider will give you specific guidelines to follow  · Do not smoke  Nicotine and other chemicals in cigarettes and cigars can cause lung and heart damage   Ask your healthcare provider for information if you currently smoke and need help to quit  E-cigarettes or smokeless tobacco still contain nicotine  Talk to your healthcare provider before you use these products  · Eat a variety of healthy, low-fat foods  Healthy foods include fruits, vegetables, whole-grain breads, low-fat dairy products, beans, lean meats, and fish  Ask for more information about a heart healthy diet  · Ask about activity  Your healthcare provider will tell you which activities to limit or avoid  Ask when you can drive, return to work, and have sex  Ask about the best exercise plan for you  · Maintain a healthy weight  Ask your healthcare provider how much you should weigh  Ask him or her to help you create a weight loss plan if you are overweight  · Get the flu and pneumonia vaccines  All adults should get the influenza (flu) vaccine  Get it every year as soon as it becomes available  The pneumococcal vaccine is given to adults aged 72 years or older  The vaccine is given every 5 years to prevent pneumococcal disease, such as pneumonia  © 2017 [facility] Information is for End User's use only and may not be sold, redistributed or otherwise used for commercial purposes  All illustrations and images included in CareNotes® are the copyrighted property of A [employer] M [employer] Inc  or [employer] Hinds  The above information is an  only  It is not intended as medical advice for individual conditions or treatments  Talk to your doctor, nurse or pharmacist before following any medical regimen to see if it is safe and effective for you  Cigarette Smoking and Your Health   AMBULATORY CARE:   Risks to your health if you smoke:  Nicotine and other chemicals found in tobacco damage every cell in your body  Even if you are a light smoker, you have an increased risk for cancer, heart disease, and lung disease   If you are pregnant or have diabetes, smoking increases your risk for complications  Benefits to your health if you stop smoking:   · You decrease respiratory symptoms such as coughing, wheezing, and shortness of breath  · You reduce your risk for cancers of the lung, mouth, throat, kidney, bladder, pancreas, stomach, and cervix  If you already have cancer, you increase the benefits of chemotherapy  You also reduce your risk for cancer returning or a second cancer from developing  · You reduce your risk for heart disease, blood clots, heart attack, and stroke  · You reduce your risk for lung infections, and diseases such as pneumonia, asthma, chronic bronchitis, and emphysema  · Your circulation improves  More oxygen can be delivered to your body  If you have diabetes, you lower your risk for complications, such as kidney, artery, and eye diseases  You also lower your risk for nerve damage  Nerve damage can lead to amputations, poor vision, and blindness  · You improve your body's ability to heal and to fight infections  Benefits to the health of others if you stop smoking:  Tobacco is harmful to nonsmokers who breathe in your secondhand smoke  The following are ways the health of others around you may improve when you stop smoking:  · You lower the risks for lung cancer and heart disease in nonsmoking adults  · If you are pregnant, you lower the risk for miscarriage, early delivery, low birth weight, and stillbirth  You also lower your baby's risk for SIDS, obesity, developmental delay, and neurobehavioral problems, such as ADHD  · If you have children, you lower their risk for ear infections, colds, pneumonia, bronchitis, and asthma  For more information and support to stop smoking:   · Smokefree  gov  Phone: 2- 292 - 080-5728  Web Address: www [employer]  Follow up with your healthcare provider as directed:  Write down your questions so you remember to ask them during your visits     © 2017 [facility] Information is for End User's use only and may not be sold, redistributed or otherwise used for commercial purposes  All illustrations and images included in CareNotes® are the copyrighted property of A [employer] M [employer] Inc  or [employer] Hind[employer]  The above information is an  only  It is not intended as medical advice for individual conditions or treatments  Talk to your doctor, nurse or pharmacist before following any medical regimen to see if it is safe and effective for you

## 2019-02-10 NOTE — ED PROVIDER NOTES
History  Chief Complaint   Patient presents with    Chest Pain     starting at 1300, reports right sided chest tightness with right sided chest pain  Reports pain is intermittent  Has not taken anything for pain PTA  PATIENT IS A 44YEAR-OLD MALE THAT STARTED WITH RIGHT-SIDED CHEST PAIN AROUND 1 O'CLOCK TODAY WHILE AT REST  HE DESCRIBES AS SHARP SHOOTING PAIN THAT INCREASES WITH DEEP BREATHING ONLY  HE HAS NO FEVERS, CHILLS COUGH, HEMOPTYSIS, LOWER EXTREMITY SWELLING  PATIENT STATES THAT THE PAIN STATES IT STAYS ON HIS RIGHT SIDE WITHOUT ANY RADIATION  HE HAS NO DIAPHORESIS, NAUSEA, VOMITING, SYNCOPE, PALPITATIONS  HE HAS NO SHORTNESS OF BREATH OR DYSPNEA ON EXERTION  HE HAS A HISTORY OF ASTHMA HAS NEVER BEEN INTUBATED FOR THIS  HE DID NOT TAKE ANYTHING FOR THIS PAIN  HE HAS NO SIGNIFICANT FAMILY HISTORY OF CORONARY ARTERY DISEASE          History provided by:  Patient   used: No    Chest Pain   Pain location:  R chest  Pain quality: sharp and shooting    Pain radiates to:  Does not radiate  Pain radiates to the back: no    Pain severity:  Mild  Onset quality:  Gradual  Timing:  Constant  Progression:  Waxing and waning  Context: breathing and at rest    Relieved by:  None tried  Worsened by:  Deep breathing  Ineffective treatments:  None tried  Associated symptoms: no abdominal pain, no AICD problem, no altered mental status, no anorexia, no anxiety, no back pain, no claudication, no cough, no diaphoresis, no dizziness, no dysphagia, no fatigue, no fever, no headache, no heartburn, no lower extremity edema, no nausea, no near-syncope, no numbness, no orthopnea, no palpitations, no PND, no shortness of breath, no syncope, not vomiting and no weakness    Risk factors: smoking    Risk factors: no aortic disease, no birth control, no coronary artery disease, no diabetes mellitus, no May-Danlos syndrome, no high cholesterol, no hypertension, no immobilization, no Marfan's syndrome, not obese, no prior DVT/PE and no surgery        Prior to Admission Medications   Prescriptions Last Dose Informant Patient Reported? Taking? albuterol (PROVENTIL HFA,VENTOLIN HFA) 90 mcg/act inhaler   Yes Yes   Sig: Inhale 2 puffs every 6 (six) hours as needed for wheezing   fluticasone-salmeterol (ADVAIR) 100-50 mcg/dose inhaler   No Yes   Sig: Inhale 1 puff 2 (two) times a day Rinse mouth after use  Facility-Administered Medications: None       Past Medical History:   Diagnosis Date    Asthma        Past Surgical History:   Procedure Laterality Date    HIP FRACTURE SURGERY      HIP SURGERY Right     screws and plate    JOINT REPLACEMENT      STOMACH SURGERY      TOTAL HIP ARTHROPLASTY         Family History   Problem Relation Age of Onset    Asthma Maternal Uncle     Skin cancer Father      I have reviewed and agree with the history as documented  Social History   Substance Use Topics    Smoking status: Current Some Day Smoker     Types: Cigarettes    Smokeless tobacco: Never Used    Alcohol use No        Review of Systems   Constitutional: Negative for diaphoresis, fatigue and fever  HENT: Negative for ear pain, sore throat and trouble swallowing  Eyes: Negative for visual disturbance  Respiratory: Negative for cough, chest tightness and shortness of breath  Cardiovascular: Positive for chest pain  Negative for palpitations, orthopnea, claudication, syncope, PND and near-syncope  Gastrointestinal: Negative for abdominal pain, anorexia, heartburn, nausea and vomiting  Genitourinary: Negative for difficulty urinating and dysuria  Musculoskeletal: Negative for back pain and neck pain  Skin: Negative for rash  Neurological: Negative for dizziness, weakness, numbness and headaches  Psychiatric/Behavioral: Negative for confusion  All other systems reviewed and are negative  Physical Exam  Physical Exam   Constitutional: He is oriented to person, place, and time   He appears well-developed and well-nourished  No distress  HENT:   Head: Normocephalic and atraumatic  Mouth/Throat: Oropharynx is clear and moist    Patient is maintaining airway maintaining secretions  Uvula midline without edema  Eyes: Pupils are equal, round, and reactive to light  Conjunctivae and EOM are normal    Neck: Normal range of motion  Neck supple  Cardiovascular: Normal rate, regular rhythm, normal heart sounds and intact distal pulses  No murmur heard  Pulses:       Radial pulses are 2+ on the right side, and 2+ on the left side  Dorsalis pedis pulses are 2+ on the right side, and 2+ on the left side  Pulmonary/Chest: Effort normal  No stridor  No respiratory distress  He has decreased breath sounds in the right lower field  Abdominal: Soft  Bowel sounds are normal  He exhibits no distension  There is no tenderness  Musculoskeletal: Normal range of motion  He exhibits no edema  Neurological: He is alert and oriented to person, place, and time  He has normal strength  No cranial nerve deficit or sensory deficit  Gait normal  GCS eye subscore is 4  GCS verbal subscore is 5  GCS motor subscore is 6  Skin: Skin is warm  Capillary refill takes less than 2 seconds  He is not diaphoretic  Nursing note and vitals reviewed        Vital Signs  ED Triage Vitals [02/09/19 1937]   Temperature Pulse Respirations Blood Pressure SpO2   98 6 °F (37 °C) 66 18 125/63 97 %      Temp Source Heart Rate Source Patient Position - Orthostatic VS BP Location FiO2 (%)   Tympanic Monitor Sitting Left arm --      Pain Score       Worst Possible Pain           Vitals:    02/09/19 1937 02/09/19 2014 02/09/19 2030 02/09/19 2100   BP: 125/63 117/69 115/68 114/68   Pulse: 66 72 56 63   Patient Position - Orthostatic VS: Sitting Sitting Lying Sitting       Visual Acuity      ED Medications  Medications   sodium chloride 0 9 % bolus 1,000 mL (0 mL Intravenous Stopped 2/9/19 2046)   aspirin chewable tablet 324 mg (324 mg Oral Given 2/9/19 2000)   ketorolac (TORADOL) injection 30 mg (30 mg Intravenous Given 2/9/19 2000)   dexamethasone 10 mg/mL oral liquid 10 mg 1 mL (10 mg Oral Given 2/9/19 2056)       Diagnostic Studies  Results Reviewed     Procedure Component Value Units Date/Time    Troponin I [[id number]]  (Normal) Collected:  02/09/19 1953    Lab Status:  Final result Specimen:  Blood from Arm, Right Updated:  02/09/19 2026     Troponin I <0 01 ng/mL     D-Dimer [[id number]]  (Normal) Collected:  02/09/19 1953    Lab Status:  Final result Specimen:  Blood from Arm, Right Updated:  02/09/19 2015     D-DIMER 0 34 mg/L     Narrative:       D-DIMER:      Magnesium [[id number]]  (Normal) Collected:  02/09/19 1953    Lab Status:  Final result Specimen:  Blood from Arm, Right Updated:  02/09/19 2012     Magnesium 2 1 mg/dL     Comprehensive metabolic panel [[id number]]  (Normal) Collected:  02/09/19 1953    Lab Status:  Final result Specimen:  Blood from Arm, Right Updated:  02/09/19 2012     Sodium 140 mmol/L      Potassium 3 7 mmol/L      Chloride 102 mmol/L      CO2 28 mmol/L      ANION GAP 10 mmol/L      BUN 17 mg/dL      Creatinine 1 04 mg/dL      Glucose 98 mg/dL      Calcium 9 6 mg/dL      AST 24 U/L      ALT 28 U/L      Alkaline Phosphatase 64 U/L      Total Protein 7 8 g/dL      Albumin 4 6 g/dL      Total Bilirubin 0 40 mg/dL      eGFR 88 ml/min/1 73sq m     Narrative:         National Kidney Disease Education Program recommendations are as follows:  GFR calculation is accurate only with a steady state creatinine  Chronic Kidney disease less than 60 ml/min/1 73 sq  meters  Kidney failure less than 15 ml/min/1 73 sq  meters      CBC and differential [[id number]]  (Normal) Collected:  02/09/19 1953    Lab Status:  Final result Specimen:  Blood from Arm, Right Updated:  02/09/19 2008     WBC 9 80 Thousand/uL      RBC 5 19 Million/uL      Hemoglobin 15 2 g/dL      Hematocrit 46 4 %      MCV 89 fL      MCH 29 4 pg MCHC 32 8 g/dL      RDW 13 0 %      MPV 9 1 fL      Platelets 822 Thousands/uL      Neutrophils Relative 56 %      Lymphocytes Relative 32 %      Monocytes Relative 7 %      Eosinophils Relative 4 %      Basophils Relative 1 %      Neutrophils Absolute 5 40 Thousands/µL      Lymphocytes Absolute 3 10 Thousands/µL      Monocytes Absolute 0 70 Thousand/µL      Eosinophils Absolute 0 40 Thousand/µL      Basophils Absolute 0 10 Thousands/µL                  XR chest 2 views    (Results Pending)              Procedures  Procedures       Phone Contacts  ED Phone Contact    ED Course         HEART Risk Score      Most Recent Value   History  0 Filed at: 02/09/2019 2029   ECG  1 Filed at: 02/09/2019 2029   Age  0 Filed at: 02/09/2019 2029   Risk Factors  1 Filed at: 02/09/2019 2029   Troponin  0 Filed at: 02/09/2019 2029   Heart Score Risk Calculator   History  0 Filed at: 02/09/2019 2029   ECG  1 Filed at: 02/09/2019 2029   Age  0 Filed at: 02/09/2019 2029   Risk Factors  1 Filed at: 02/09/2019 2029   Troponin  0 Filed at: 02/09/2019 2029   HEART Score  2 Filed at: 02/09/2019 2029   HEART Score  2 Filed at: 02/09/2019 2029            PERC Rule for PE      Most Recent Value   PERC Rule for PE   Age >=50  0 Filed at: 02/09/2019 1955   HR >=100  0 Filed at: 02/09/2019 1955   O2 Sat on room air < 95%  0 Filed at: 02/09/2019 1955   History of PE or DVT  0 Filed at: 02/09/2019 1955   Recent trauma or surgery  0 Filed at: 02/09/2019 1955   Hemoptysis  0 Filed at: 02/09/2019 1955   Exogenous estrogen  0 Filed at: 02/09/2019 1955   Unilateral leg swelling  0 Filed at: 02/09/2019 1955   PERC Rule for PE Results  0 Filed at: 02/09/2019 1955                Wells' Criteria for PE      Most Recent Value   Wells' Criteria for PE   Clinical signs and symptoms of DVT  0 Filed at: 02/09/2019 1955   PE is primary diagnosis or equally likely  0 Filed at: 02/09/2019 1955   HR >100  0 Filed at: 02/09/2019 1955   Immobilization at least 3 days or Surgery in the previous 4 weeks  0 Filed at: 02/09/2019 1955   Previous, objectively diagnosed PE or DVT  0 Filed at: 02/09/2019 1955   Hemoptysis  0 Filed at: 02/09/2019 1955   Malignancy with treatment within 6 months or palliative  0 Filed at: 02/09/2019 [facility] [address] Philippe' Criteria Total  0 Filed at: 02/09/2019 1955            MDM  Number of Diagnoses or Management Options  Right-sided chest pain:   Tobacco abuse:   Diagnosis management comments: Patient is a 52-year-old male coming in today with right-sided chest pain that is been ongoing for greater than 4 hours  On exam he is nontoxic appearing in no distress  Vital signs are stable  Will perform D-dimer as patient is pleuritic chest discomfort  He is perc negative and low wells  Will start cardiac workup as well as give aspirin and Toradol for pain control  Different diagnosis : ACS, NSTEMI, pleurisry, pneumothorax, costochondritis, pneumonia, GERD, anxiety, hepatitis, pancreatitis, aortic dissection, pericarditis, myocarditis, pericardial effusion, asthma exacerbation, COPD exacerbation, herpes zoster, peritoneal rupture, esophageal spasm  EKG INTERPRETATION at 7:42 p m  RHYTHM:  Normal sinus rhythm at 66 beats per minute  AXIS:  Normal axis  INTERVALS:  VA interval measured 126 milliseconds  QRS COMPLEX:  QRS measured at 76 milliseconds  ST SEGMENT:  Nonspecific ST segment changes  Diffuse artifact  QT INTERVAL:  QTC measured 402 milliseconds  COMPARED WITH PRIOR   Rocael Arguelles Interpretation by Lillie Gutierrez DO    8:29 PM  Labs reveal no acute end-organ damage, no NSTEMI, ACS, D-dimer negative  Pending chest x-ray read  8:48 PM  Patient resting in bed in no apparent distress  He states he this pain-free  He does report that he was having upper respiratory symptoms over the past several weeks  These have resolved  Discussed with patient will give Decadron prior to discharge  Return to ER instructions given  Heart score 2  D-dimer negative  On reexamine patient is alert oriented x3, cranial nerves 2-12 grossly intact regular rate and rhythm  No respiratory distress  Neurologically intact  Portions of the record may have been created with voice recognition software  Occasional wrong word or "sound a like" substitutions may have occurred due to the inherent limitations of voice recognition software  Read the chart carefully and recognize, using context, where substitutions have occurred  Amount and/or Complexity of Data Reviewed  Clinical lab tests: ordered and reviewed  Tests in the radiology section of CPT®: ordered and reviewed  Tests in the medicine section of CPT®: ordered and reviewed  Independent visualization of images, tracings, or specimens: yes (No pneumothoraces  No focal consolidation  Cardiac silhouette stable)        Disposition  Final diagnoses:   Right-sided chest pain   Tobacco abuse     Time reflects when diagnosis was documented in both MDM as applicable and the Disposition within this note     Time User Action Codes Description Comment    2/9/2019  8:29 PM Sharon Salazar Add [R07 9] Right-sided chest pain     2/9/2019  8:29 PM Kelby Gutiérrez Add [Z72 0] Tobacco abuse       ED Disposition     ED Disposition Condition Date/Time Comment    Discharge  Sat Feb 9, 2019  8:51 PM Audelia Giron  discharge to home/self care      Condition at discharge: Stable        Follow-up Information     Follow up With Specialties Details Why Arthur Schedule an appointment as soon as possible for a visit in 2 days  [address] [address]  [phone]            Discharge Medication List as of 2/9/2019  8:49 PM      CONTINUE these medications which have NOT CHANGED    Details   albuterol (PROVENTIL HFA,VENTOLIN HFA) 90 mcg/act inhaler Inhale 2 puffs every 6 (six) hours as needed for wheezing, Historical Med      fluticasone-salmeterol (ADVAIR) 100-50 mcg/dose inhaler Inhale 1 puff 2 (two) times a day Rinse mouth after use , Starting Tue 10/30/2018, Normal           No discharge procedures on file      ED Provider  Electronically Signed by           Marquita Buckner DO  02/09/19 6205

## 2019-02-25 ENCOUNTER — OFFICE VISIT (OUTPATIENT)
Dept: INTERNAL MEDICINE CLINIC | Facility: CLINIC | Age: 44
End: 2019-02-25

## 2019-02-25 VITALS
TEMPERATURE: 98 F | BODY MASS INDEX: 28.21 KG/M2 | SYSTOLIC BLOOD PRESSURE: 100 MMHG | DIASTOLIC BLOOD PRESSURE: 70 MMHG | WEIGHT: 190.48 LBS | HEART RATE: 96 BPM | HEIGHT: 69 IN

## 2019-02-25 DIAGNOSIS — R07.82 INTERCOSTAL PAIN: Primary | ICD-10-CM

## 2019-02-25 PROCEDURE — 99213 OFFICE O/P EST LOW 20 MIN: CPT | Performed by: INTERNAL MEDICINE

## 2019-02-25 NOTE — PROGRESS NOTES
ASSESSMENT/PLAN:    Intercostal pain/chest pain - right-sided  -costochondritis versus pleurisy versus tender point  -D-dimer, EKG, troponin, chest x-ray, CBC, CMP within normal limits/unremarkable  -advised ice to the area and as needed ibuprofen or Tylenol  -advised to stretch pectoral area  -advised as needed Ramiro-Villatoro cream to area  Health Maintenance:  Advised diet and exercise  Advised to refrain from tobacco, alcohol, illicit drug use  Advised medical compliance      Schedule a follow-up appointment as needed with PCP - Dr Reynaldo Hubbard  CHIEF COMPLAINT: ED follow-up for chest pain    HISTORY OF PRESENT ILLNESS:    Patient is a 45-year-old male with past medical history of asthma well controlled on albuterol and Advair presents today for follow-up from the emergency department for chest pain  He stated that this chest pain has been going on for approximately 5 weeks  It is located around the 5th through 7th intercostal space on the midclavicular line on the right side of his chest   He describes the pain as a sharp stabbing sensation 6/10 without radiation  The chest pain is reproducible with touch and deep breathing and is worse with prolonged periods of rest/inactivity but better with movement and stretching  Patient also reports that the chest pain occurs on/off lasts approximately 20 minutes  He also reports mild trauma to the area approximately 5 weeks ago  He denies any palpitation, diaphoresis, syncope, lightheadedness, dizziness, visual changes, shortness of breath associated with the chest pain  In the emergency department patient had normal EKG, negative troponin, negative D-dimer, negative chest x-ray  CBC and CMP were within normal limits  Patient's chest pain resolved during his emergency department course  Emergency department he received 325 mg aspirin, 30 mg Toradol, 10 mg dexamethasone  He was discharged in stable condition with follow-up with primary care provider          The following portions of the patient's history were reviewed and updated as appropriate: allergies, current medications, past family history, past medical history, past social history, past surgical history and problem list     Review of Systems:  Currently Denies fever, chills, headaches, lightheadedness, dizziness, visual disturbances, sore throat, palpitations, SOB, abdominal pain, nausea, vomiting, or trouble urinating/ defecating  Currently states the chest pain is reproducible with palpation at the same site  OBJECTIVE:  Vitals:    02/25/19 1439   BP: 100/70   BP Location: Left arm   Patient Position: Sitting   Cuff Size: Adult   Pulse: 96   Temp: 98 °F (36 7 °C)   TempSrc: Oral   Weight: 86 4 kg (190 lb 7 6 oz)   Height: 5' 9" (1 753 m)     Physical Exam   Constitutional: He is oriented to person, place, and time  He appears well-developed and well-nourished  No distress  HENT:   Head: Normocephalic and atraumatic  Right Ear: External ear normal    Left Ear: External ear normal    Nose: Nose normal    Eyes: Pupils are equal, round, and reactive to light  Conjunctivae and EOM are normal  Right eye exhibits no discharge  Left eye exhibits no discharge  No scleral icterus  Neck: No tracheal deviation present  No thyromegaly present  Cardiovascular: Normal rate, regular rhythm, normal heart sounds and intact distal pulses  Exam reveals no gallop and no friction rub  No murmur heard  Pulmonary/Chest: Effort normal and breath sounds normal  No stridor  No respiratory distress  He has no wheezes  He has no rales  He exhibits tenderness  Right chest wall tenderness to palpation   Abdominal: Soft  Bowel sounds are normal  He exhibits no distension  There is no tenderness  There is no rebound and no guarding  Musculoskeletal: He exhibits no edema, tenderness or deformity  Lymphadenopathy:     He has no cervical adenopathy  Neurological: He is alert and oriented to person, place, and time   No cranial nerve deficit or sensory deficit  Skin: Skin is warm and dry  Capillary refill takes less than 2 seconds  No rash noted  He is not diaphoretic  No erythema  No pallor  Vitals reviewed          Current Outpatient Medications:     albuterol (PROVENTIL HFA,VENTOLIN HFA) 90 mcg/act inhaler, Inhale 2 puffs every 6 (six) hours as needed for wheezing, Disp: , Rfl:     fluticasone-salmeterol (ADVAIR) 100-50 mcg/dose inhaler, Inhale 1 puff 2 (two) times a day Rinse mouth after use , Disp: 1 Inhaler, Rfl: 0    Past Medical History:   Diagnosis Date    Asthma      Past Surgical History:   Procedure Laterality Date    HIP FRACTURE SURGERY      HIP SURGERY Right     screws and plate    JOINT REPLACEMENT      STOMACH SURGERY      TOTAL HIP ARTHROPLASTY       Social History     Socioeconomic History    Marital status: Single     Spouse name: Not on file    Number of children: Not on file    Years of education: Not on file    Highest education level: Not on file   Occupational History    Not on file   Social Needs    Financial resource strain: Not on file    Food insecurity:     Worry: Not on file     Inability: Not on file    Transportation needs:     Medical: Not on file     Non-medical: Not on file   Tobacco Use    Smoking status: Current Some Day Smoker     Types: Cigarettes    Smokeless tobacco: Never Used    Tobacco comment: "7 cig a week"   Substance and Sexual Activity    Alcohol use: No    Drug use: No    Sexual activity: Not Currently   Lifestyle    Physical activity:     Days per week: Not on file     Minutes per session: Not on file    Stress: Not on file   Relationships    Social connections:     Talks on phone: Not on file     Gets together: Not on file     Attends Mandaeism service: Not on file     Active member of club or organization: Not on file     Attends meetings of clubs or organizations: Not on file     Relationship status: Not on file    Intimate partner violence: Fear of current or ex partner: Not on file     Emotionally abused: Not on file     Physically abused: Not on file     Forced sexual activity: Not on file   Other Topics Concern    Not on file   Social History Narrative    Illiteracy     Family History   Problem Relation Age of Onset    Asthma Maternal Uncle     Skin cancer Father        ==  DO Adair Peralta [address] Internal Medicine PGY-2    [facility]  [address] [facility] SPECIALTY \[facility]"" - [place] , [address] [address] [facility] [address] [facility]  Office: [phone]  Fax: [phone]

## 2019-05-28 ENCOUNTER — OFFICE VISIT (OUTPATIENT)
Dept: INTERNAL MEDICINE CLINIC | Facility: CLINIC | Age: 44
End: 2019-05-28

## 2019-05-28 VITALS
WEIGHT: 191.8 LBS | BODY MASS INDEX: 28.41 KG/M2 | SYSTOLIC BLOOD PRESSURE: 110 MMHG | TEMPERATURE: 98.2 F | DIASTOLIC BLOOD PRESSURE: 72 MMHG | HEART RATE: 92 BPM | HEIGHT: 69 IN

## 2019-05-28 DIAGNOSIS — M54.5 ACUTE MIDLINE LOW BACK PAIN, WITH SCIATICA PRESENCE UNSPECIFIED: ICD-10-CM

## 2019-05-28 DIAGNOSIS — F17.213 CIGARETTE NICOTINE DEPENDENCE WITH WITHDRAWAL: Primary | ICD-10-CM

## 2019-05-28 DIAGNOSIS — R20.0 PERINEAL NUMBNESS: ICD-10-CM

## 2019-05-28 PROBLEM — M54.50 ACUTE MIDLINE LOW BACK PAIN: Status: ACTIVE | Noted: 2019-05-28

## 2019-05-28 PROCEDURE — 99213 OFFICE O/P EST LOW 20 MIN: CPT | Performed by: HOSPITALIST

## 2019-05-28 RX ORDER — NICOTINE 21 MG/24HR
1 PATCH, TRANSDERMAL 24 HOURS TRANSDERMAL EVERY 24 HOURS
Qty: 21 PATCH | Refills: 0 | Status: SHIPPED | OUTPATIENT
Start: 2019-05-28 | End: 2021-06-24

## 2019-05-30 ENCOUNTER — APPOINTMENT (OUTPATIENT)
Dept: LAB | Facility: HOSPITAL | Age: 44
End: 2019-05-30
Payer: COMMERCIAL

## 2019-05-30 ENCOUNTER — TELEPHONE (OUTPATIENT)
Dept: INTERNAL MEDICINE CLINIC | Facility: CLINIC | Age: 44
End: 2019-05-30

## 2019-05-30 ENCOUNTER — TRANSCRIBE ORDERS (OUTPATIENT)
Dept: ADMINISTRATIVE | Facility: HOSPITAL | Age: 44
End: 2019-05-30

## 2019-05-30 DIAGNOSIS — F17.210 CIGARETTE SMOKER: ICD-10-CM

## 2019-05-30 DIAGNOSIS — J45.40 MODERATE PERSISTENT ASTHMA, UNSPECIFIED WHETHER COMPLICATED: Primary | ICD-10-CM

## 2019-05-30 DIAGNOSIS — J45.40 MODERATE PERSISTENT ASTHMA, UNSPECIFIED WHETHER COMPLICATED: ICD-10-CM

## 2019-05-30 LAB
EST. AVERAGE GLUCOSE BLD GHB EST-MCNC: 108 MG/DL
HBA1C MFR BLD: 5.4 % (ref 4.2–6.3)

## 2019-05-30 PROCEDURE — 83036 HEMOGLOBIN GLYCOSYLATED A1C: CPT

## 2019-05-30 PROCEDURE — 36415 COLL VENOUS BLD VENIPUNCTURE: CPT

## 2019-06-09 ENCOUNTER — HOSPITAL ENCOUNTER (OUTPATIENT)
Dept: MRI IMAGING | Facility: HOSPITAL | Age: 44
Discharge: HOME | End: 2019-06-09
Payer: COMMERCIAL

## 2019-06-09 DIAGNOSIS — M54.5 ACUTE MIDLINE LOW BACK PAIN, WITH SCIATICA PRESENCE UNSPECIFIED: ICD-10-CM

## 2019-06-09 DIAGNOSIS — R20.0 PERINEAL NUMBNESS: ICD-10-CM

## 2019-06-09 PROCEDURE — 72148 MRI LUMBAR SPINE W/O DYE: CPT

## 2019-06-10 DIAGNOSIS — M54.16 LUMBAR RADICULOPATHY: Primary | ICD-10-CM

## 2019-06-10 DIAGNOSIS — M47.817 SPONDYLOSIS OF LUMBOSACRAL REGION WITHOUT MYELOPATHY OR RADICULOPATHY: ICD-10-CM

## 2019-06-18 ENCOUNTER — HOSPITAL ENCOUNTER (EMERGENCY)
Facility: HOSPITAL | Age: 44
Discharge: HOME | End: 2019-06-18
Attending: EMERGENCY MEDICINE
Payer: COMMERCIAL

## 2019-06-18 VITALS
HEART RATE: 77 BPM | DIASTOLIC BLOOD PRESSURE: 76 MMHG | RESPIRATION RATE: 14 BRPM | OXYGEN SATURATION: 98 % | SYSTOLIC BLOOD PRESSURE: 147 MMHG | WEIGHT: 185 LBS | TEMPERATURE: 97.9 F

## 2019-06-18 DIAGNOSIS — L03.311 CELLULITIS OF ABDOMINAL WALL: Primary | ICD-10-CM

## 2019-06-18 PROCEDURE — 99283 EMERGENCY DEPT VISIT LOW MDM: CPT | Performed by: EMERGENCY MEDICINE

## 2019-06-18 PROCEDURE — 99282 EMERGENCY DEPT VISIT SF MDM: CPT

## 2019-06-18 RX ORDER — CEPHALEXIN 500 MG/1
500 CAPSULE ORAL ONCE
Status: COMPLETED | OUTPATIENT
Start: 2019-06-18 | End: 2019-06-18

## 2019-06-18 RX ORDER — SULFAMETHOXAZOLE AND TRIMETHOPRIM 800; 160 MG/1; MG/1
1 TABLET ORAL 2 TIMES DAILY
Qty: 14 TABLET | Refills: 0 | Status: SHIPPED | OUTPATIENT
Start: 2019-06-18 | End: 2019-06-25

## 2019-06-18 RX ORDER — CEPHALEXIN 500 MG/1
500 CAPSULE ORAL 4 TIMES DAILY
Qty: 28 CAPSULE | Refills: 0 | Status: SHIPPED | OUTPATIENT
Start: 2019-06-18 | End: 2019-06-25

## 2019-06-18 RX ORDER — SULFAMETHOXAZOLE AND TRIMETHOPRIM 800; 160 MG/1; MG/1
1 TABLET ORAL ONCE
Status: COMPLETED | OUTPATIENT
Start: 2019-06-18 | End: 2019-06-18

## 2019-06-18 RX ADMIN — SULFAMETHOXAZOLE AND TRIMETHOPRIM 1 TABLET: 800; 160 TABLET ORAL at 10:37

## 2019-06-18 RX ADMIN — CEPHALEXIN 500 MG: 500 CAPSULE ORAL at 10:37

## 2019-06-19 ENCOUNTER — TELEPHONE (OUTPATIENT)
Dept: MULTI SPECIALTY CLINIC | Facility: CLINIC | Age: 44
End: 2019-06-19

## 2019-06-20 ENCOUNTER — NURSE TRIAGE (OUTPATIENT)
Dept: PHYSICAL THERAPY | Facility: OTHER | Age: 44
End: 2019-06-20

## 2019-06-20 DIAGNOSIS — M54.50 ACUTE MIDLINE LOW BACK PAIN WITHOUT SCIATICA: Primary | ICD-10-CM

## 2019-06-22 NOTE — ED PROVIDER NOTES
History  Chief Complaint   Patient presents with    Abscess     Patient popped a "pimple" on the side of his abdomen, now he thinks it might be infected  Keeps scratching the area  History provided by:  Patient  Abscess   Location:  Torso  Torso abscess location:  L flank  Abscess quality: draining, fluctuance, induration, painful and redness    Red streaking: yes    Progression:  Worsening  Pain details:     Quality:  Pressure and dull    Severity:  Moderate    Timing:  Constant    Progression:  Worsening  Chronicity:  New  Relieved by:  Nothing  Worsened by:  Nothing  Ineffective treatments:  None tried  Associated symptoms: no fever, no headaches and no nausea        None       Past Medical History:   Diagnosis Date    Asthma        Past Surgical History:   Procedure Laterality Date    HIP FRACTURE SURGERY      HIP SURGERY Right     screws and plate    HIP SURGERY      JOINT REPLACEMENT      STOMACH SURGERY      TOTAL HIP ARTHROPLASTY         Family History   Problem Relation Age of Onset    Asthma Maternal Uncle     Skin cancer Father      I have reviewed and agree with the history as documented  Social History     Tobacco Use    Smoking status: Current Every Day Smoker     Packs/day: 0 20     Types: Cigarettes    Smokeless tobacco: Never Used    Tobacco comment: "7 cig a week"   Substance Use Topics    Alcohol use: No    Drug use: Not Currently        Review of Systems   Constitutional: Negative for chills and fever  HENT: Negative for rhinorrhea, sore throat and trouble swallowing  Eyes: Negative for pain  Respiratory: Negative for cough, shortness of breath, wheezing and stridor  Cardiovascular: Negative for chest pain and leg swelling  Gastrointestinal: Negative for abdominal pain, diarrhea and nausea  Endocrine: Negative for polyuria  Genitourinary: Negative for dysuria, flank pain and urgency     Musculoskeletal: Negative for joint swelling, myalgias and neck stiffness  Skin: Negative for rash  Allergic/Immunologic: Negative for immunocompromised state  Neurological: Negative for dizziness, syncope, weakness, numbness and headaches  Psychiatric/Behavioral: Negative for confusion and suicidal ideas  All other systems reviewed and are negative  Physical Exam  Physical Exam   Constitutional: He is oriented to person, place, and time  He appears well-developed and well-nourished  HENT:   Head: Normocephalic and atraumatic  Eyes: Pupils are equal, round, and reactive to light  EOM are normal    Neck: Normal range of motion  Neck supple  Cardiovascular: Normal rate and regular rhythm  Exam reveals no friction rub  No murmur heard  Pulmonary/Chest: Breath sounds normal  No respiratory distress  He has no wheezes  He has no rales  Abdominal: Soft  Bowel sounds are normal  He exhibits no distension  There is no tenderness  Surrounding redness with noted post well as actively draining at this point time  Musculoskeletal: Normal range of motion  He exhibits no edema or tenderness  Neurological: He is alert and oriented to person, place, and time  Skin: Skin is warm  No rash noted  Psychiatric: He has a normal mood and affect  Nursing note and vitals reviewed        Vital Signs  ED Triage Vitals [06/18/19 1022]   Temperature Pulse Respirations Blood Pressure SpO2   97 9 °F (36 6 °C) 77 14 147/76 98 %      Temp Source Heart Rate Source Patient Position - Orthostatic VS BP Location FiO2 (%)   Tympanic Monitor -- -- --      Pain Score       3           Vitals:    06/18/19 1022   BP: 147/76   Pulse: 77         Visual Acuity      ED Medications  Medications   cephalexin (KEFLEX) capsule 500 mg (500 mg Oral Given 6/18/19 1037)   sulfamethoxazole-trimethoprim (BACTRIM DS) 800-160 mg per tablet 1 tablet (1 tablet Oral Given 6/18/19 1037)       Diagnostic Studies  Results Reviewed     None                 No orders to display Procedures  Procedures       ED Course                               MDM  Number of Diagnoses or Management Options  Cellulitis of abdominal wall: new and requires workup  Diagnosis management comments: This is a 26-year-old male who presents emergency department with left-sided abdominal wall pain with noted pustule that is actively draining with some surrounding cellulitis treatment as such with antibiotics plan outpatient management followup given strict instructions when to return back to the emergency department  Pt re-examined and evaluated after testing and treatment  Spoke with the patient and feeling improved and sxs have resolved  Will discharge home with close f/u with pcp and instructed to return to the ED if sxs worsen or continue  Pt agrees with the plan for discharge and feels comfortable to go home with proper f/u  Advised to return for worsening or additional problems  Diagnostic tests were reviewed and questions answered  Diagnosis, care plan and treatment options were discussed  The patient understand instructions and will follow up as directed  Disposition  Final diagnoses:   Cellulitis of abdominal wall     Time reflects when diagnosis was documented in both MDM as applicable and the Disposition within this note     Time User Action Codes Description Comment    6/18/2019 10:29 AM Daisy Abarca Add [L03 [id number]] Cellulitis of abdominal wall       ED Disposition     ED Disposition Condition Date/Time Comment    Discharge Stable Tue Jun 18, 2019 10:29 AM Sarah Gardiner discharge to home/self care              Follow-up Information     Follow up With Specialties Details Why Contact Info    Anisa Clements MD Internal Medicine, Emergency Medicine   [address]  [phone]            Discharge Medication List as of 6/18/2019 10:30 AM      START taking these medications    Details   cephalexin (KEFLEX) 500 mg capsule Take 1 capsule (500 mg total) by mouth 4 (four) times a day for 7 days, Starting Tue 6/18/2019, Until Tue 6/25/2019, Print      sulfamethoxazole-trimethoprim (BACTRIM DS) 800-160 mg per tablet Take 1 tablet by mouth 2 (two) times a day for 7 days smx-tmp DS (BACTRIM) 800-160 mg tabs (1tab q12 D10), Starting Tue 6/18/2019, Until Tue 6/25/2019, Print           No discharge procedures on file      ED Provider  Electronically Signed by           Akilah Jaimes DO  06/21/19 4782

## 2019-06-25 ENCOUNTER — EVALUATION (OUTPATIENT)
Dept: PHYSICAL THERAPY | Facility: CLINIC | Age: 44
End: 2019-06-25
Payer: COMMERCIAL

## 2019-06-25 VITALS — SYSTOLIC BLOOD PRESSURE: 122 MMHG | DIASTOLIC BLOOD PRESSURE: 80 MMHG | HEART RATE: 63 BPM | TEMPERATURE: 98.5 F

## 2019-06-25 DIAGNOSIS — M54.50 ACUTE MIDLINE LOW BACK PAIN WITHOUT SCIATICA: Primary | ICD-10-CM

## 2019-06-25 PROCEDURE — 97161 PT EVAL LOW COMPLEX 20 MIN: CPT | Performed by: PHYSICAL THERAPIST

## 2019-07-01 ENCOUNTER — OFFICE VISIT (OUTPATIENT)
Dept: PHYSICAL THERAPY | Facility: CLINIC | Age: 44
End: 2019-07-01
Payer: COMMERCIAL

## 2019-07-01 DIAGNOSIS — M54.50 ACUTE MIDLINE LOW BACK PAIN WITHOUT SCIATICA: Primary | ICD-10-CM

## 2019-07-01 PROCEDURE — 97140 MANUAL THERAPY 1/> REGIONS: CPT | Performed by: PHYSICAL THERAPIST

## 2019-07-01 PROCEDURE — 97110 THERAPEUTIC EXERCISES: CPT | Performed by: PHYSICAL THERAPIST

## 2019-07-01 NOTE — PROGRESS NOTES
Daily Note     Today's date: 2019  Patient name: Berry Jones  : 1975  MRN: [id number]  Referring provider: Patricia Durham MD  Dx:   Encounter Diagnosis     ICD-10-CM    1  Acute midline low back pain without sciatica M54 5                   Subjective: Patient comes to therapy reporting he agrees that his bed is contributing to back pain, as was discussed at his evaluation evaluation  Comes to therapy reporting stiffness through low back  Objective: See treatment diary below      Assessment: Tolerated treatment well  Patient exhibited good technique with therapeutic exercises and would benefit from continued PT      Plan: Progress note during next visit  Progress treatment as tolerated  Precautions: asthma    Date            FOTO IE            Re-eval IE              Daily Treatment Diary     Manual            Lumbar sidelying rotational mobs BRIGIDA  Gr IV BRIGIDA  Gr IV          T/S PA mobs BRIGIDA  Gr V BRIGIDA  Gr V          Seated T/S rot mob BRIGIDA  Gr IV np              Exercise Diary            bike  5 min          Lumbar pball roll outs  10"x3 ea          Seated hamstring str  nv          Seated QL stretch  15"x5 ea                      LTR  20"x5 ea          SKTC  20"x5 ea          Sidelying UTR  10"x10 ea          H/L TA   nv                                  3-way Child's pose  20"x5                       Progress to:            SLR w/ TA            H/L TA alt UE/LE            Quad   Alt UE/LE                Modalities

## 2019-07-03 ENCOUNTER — OFFICE VISIT (OUTPATIENT)
Dept: PHYSICAL THERAPY | Facility: CLINIC | Age: 44
End: 2019-07-03
Payer: COMMERCIAL

## 2019-07-03 DIAGNOSIS — M54.50 ACUTE MIDLINE LOW BACK PAIN WITHOUT SCIATICA: Primary | ICD-10-CM

## 2019-07-03 PROCEDURE — 97110 THERAPEUTIC EXERCISES: CPT

## 2019-07-03 NOTE — PROGRESS NOTES
Daily Note     Today's date: 7/3/2019  Patient name: Amparo Cabrera  : 1975  MRN: [id number]  Referring provider: Robert Wang MD  Dx:   Encounter Diagnosis     ICD-10-CM    1  Acute midline low back pain without sciatica M54 5        Start Time: 1400  Stop Time: 1455  Total time in clinic (min): 55 minutes    Subjective: Patient reports his present living situation is temporary and does not want to invest in a new mattress  Objective: See treatment diary below      Assessment: Tolerated treatment well  Progressed to transabominal activation exercises with good results  Patient exhibited good technique with therapeutic exercises and would benefit from continued PT      Plan: Progress note during next visit  Progress treatment as tolerated  Precautions: asthma    Date            FOTO IE            Re-eval IE              Daily Treatment Diary     Manual  6/25 7/1 7/3         Lumbar sidelying rotational mobs BRIGIDA  Gr IV BRIGIDA  Gr IV LF  Gr IV         T/S PA mobs BRIGIDA  Gr V BRIGIDA  Gr V KS  Gr V         Seated T/S rot mob BRIGIDA  Gr IV np              Exercise Diary  6/25 7/1 7/3         bike  5 min 5 min         Lumbar pball roll outs  10"x3 ea 10" x3 ea         Seated hamstring str  nv          Seated QL stretch  15"x5 ea 15" x 5 ea                     LTR  20"x5 ea 20" x 5 ea         SKTC  20"x5 ea 20' x 5         Sidelying UTR  10"x10 ea 10" x 10         H/L TA   nv 10" x 10                                 3-way Child's pose  20"x5  20" x 5                     Progress to:            SLR w/ TA   10 x ea         H/L TA alt UE/LE            Quad   Alt UE/LE                Modalities

## 2019-07-16 ENCOUNTER — APPOINTMENT (OUTPATIENT)
Dept: PHYSICAL THERAPY | Facility: CLINIC | Age: 44
End: 2019-07-16
Payer: COMMERCIAL

## 2019-07-18 ENCOUNTER — APPOINTMENT (OUTPATIENT)
Dept: PHYSICAL THERAPY | Facility: CLINIC | Age: 44
End: 2019-07-18
Payer: COMMERCIAL

## 2019-07-23 ENCOUNTER — APPOINTMENT (OUTPATIENT)
Dept: PHYSICAL THERAPY | Facility: CLINIC | Age: 44
End: 2019-07-23
Payer: COMMERCIAL

## 2020-02-27 ENCOUNTER — OFFICE VISIT (OUTPATIENT)
Dept: FAMILY MEDICINE CLINIC | Facility: CLINIC | Age: 45
End: 2020-02-27

## 2020-02-27 VITALS
OXYGEN SATURATION: 98 % | DIASTOLIC BLOOD PRESSURE: 78 MMHG | TEMPERATURE: 98.3 F | WEIGHT: 192 LBS | SYSTOLIC BLOOD PRESSURE: 104 MMHG | HEART RATE: 69 BPM | RESPIRATION RATE: 16 BRPM | HEIGHT: 70 IN | BODY MASS INDEX: 27.49 KG/M2

## 2020-02-27 DIAGNOSIS — Z23 NEED FOR VACCINATION: ICD-10-CM

## 2020-02-27 DIAGNOSIS — J45.20 MILD INTERMITTENT ASTHMA WITHOUT COMPLICATION: Primary | ICD-10-CM

## 2020-02-27 DIAGNOSIS — J30.1 SEASONAL ALLERGIC RHINITIS DUE TO POLLEN: ICD-10-CM

## 2020-02-27 PROCEDURE — 90715 TDAP VACCINE 7 YRS/> IM: CPT | Performed by: FAMILY MEDICINE

## 2020-02-27 PROCEDURE — 99214 OFFICE O/P EST MOD 30 MIN: CPT | Performed by: FAMILY MEDICINE

## 2020-02-27 PROCEDURE — 90471 IMMUNIZATION ADMIN: CPT | Performed by: FAMILY MEDICINE

## 2020-02-27 PROCEDURE — 1036F TOBACCO NON-USER: CPT | Performed by: FAMILY MEDICINE

## 2020-02-27 PROCEDURE — 3008F BODY MASS INDEX DOCD: CPT | Performed by: FAMILY MEDICINE

## 2020-02-27 RX ORDER — FLUTICASONE PROPIONATE 50 MCG
1 SPRAY, SUSPENSION (ML) NASAL DAILY
Qty: 1 BOTTLE | Refills: 2 | Status: SHIPPED | OUTPATIENT
Start: 2020-02-27 | End: 2021-02-18

## 2020-02-27 RX ORDER — ALBUTEROL SULFATE 90 UG/1
2 AEROSOL, METERED RESPIRATORY (INHALATION) EVERY 6 HOURS PRN
Qty: 1 INHALER | Refills: 2 | Status: SHIPPED | OUTPATIENT
Start: 2020-02-27 | End: 2020-03-13 | Stop reason: SDUPTHER
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
## 2020-03-10 ENCOUNTER — APPOINTMENT (EMERGENCY)
Dept: CT IMAGING | Facility: HOSPITAL | Age: 45
End: 2020-03-10
Payer: COMMERCIAL

## 2020-03-10 ENCOUNTER — HOSPITAL ENCOUNTER (EMERGENCY)
Facility: HOSPITAL | Age: 45
Discharge: HOME | End: 2020-03-10
Attending: EMERGENCY MEDICINE
Payer: COMMERCIAL

## 2020-03-10 VITALS
TEMPERATURE: 97 F | OXYGEN SATURATION: 95 % | BODY MASS INDEX: 28.36 KG/M2 | WEIGHT: 196.21 LBS | RESPIRATION RATE: 18 BRPM | HEART RATE: 64 BPM | DIASTOLIC BLOOD PRESSURE: 59 MMHG | SYSTOLIC BLOOD PRESSURE: 108 MMHG

## 2020-03-10 DIAGNOSIS — N20.0 NEPHROLITHIASIS: Primary | ICD-10-CM

## 2020-03-10 LAB
BACTERIA UR QL AUTO: ABNORMAL /HPF
BILIRUB UR QL STRIP: NEGATIVE
CLARITY UR: CLEAR
COLOR UR: YELLOW
GLUCOSE UR STRIP-MCNC: NEGATIVE MG/DL
HGB UR QL STRIP.AUTO: 250
KETONES UR STRIP-MCNC: ABNORMAL MG/DL
LEUKOCYTE ESTERASE UR QL STRIP: 25
MUCOUS THREADS UR QL AUTO: ABNORMAL
NITRITE UR QL STRIP: NEGATIVE
NON-SQ EPI CELLS URNS QL MICRO: ABNORMAL /HPF
PH UR STRIP.AUTO: 5 [PH]
PROT UR STRIP-MCNC: ABNORMAL MG/DL
RBC #/AREA URNS AUTO: ABNORMAL /HPF
SP GR UR STRIP.AUTO: 1.03 (ref 1–1.04)
UROBILINOGEN UA: 1 MG/DL
WBC #/AREA URNS AUTO: ABNORMAL /HPF

## 2020-03-10 PROCEDURE — 99284 EMERGENCY DEPT VISIT MOD MDM: CPT

## 2020-03-10 PROCEDURE — 82948 REAGENT STRIP/BLOOD GLUCOSE: CPT

## 2020-03-10 PROCEDURE — 99284 EMERGENCY DEPT VISIT MOD MDM: CPT | Performed by: EMERGENCY MEDICINE

## 2020-03-10 PROCEDURE — 74176 CT ABD & PELVIS W/O CONTRAST: CPT

## 2020-03-10 PROCEDURE — 81001 URINALYSIS AUTO W/SCOPE: CPT | Performed by: EMERGENCY MEDICINE

## 2020-03-10 PROCEDURE — 96372 THER/PROPH/DIAG INJ SC/IM: CPT

## 2020-03-10 RX ORDER — OXYCODONE HYDROCHLORIDE AND ACETAMINOPHEN 5; 325 MG/1; MG/1
1 TABLET ORAL EVERY 4 HOURS PRN
Qty: 10 TABLET | Refills: 0 | Status: SHIPPED | OUTPATIENT
Start: 2020-03-10 | End: 2020-03-20

## 2020-03-10 RX ORDER — CYCLOBENZAPRINE HCL 10 MG
10 TABLET ORAL ONCE
Status: COMPLETED | OUTPATIENT
Start: 2020-03-10 | End: 2020-03-10

## 2020-03-10 RX ORDER — TAMSULOSIN HYDROCHLORIDE 0.4 MG/1
0.4 CAPSULE ORAL
Qty: 5 CAPSULE | Refills: 0 | Status: SHIPPED | OUTPATIENT
Start: 2020-03-10 | End: 2021-02-18

## 2020-03-10 RX ORDER — IBUPROFEN 600 MG/1
600 TABLET ORAL EVERY 6 HOURS PRN
COMMUNITY

## 2020-03-10 RX ORDER — KETOROLAC TROMETHAMINE 30 MG/ML
15 INJECTION, SOLUTION INTRAMUSCULAR; INTRAVENOUS ONCE
Status: COMPLETED | OUTPATIENT
Start: 2020-03-10 | End: 2020-03-10

## 2020-03-10 RX ADMIN — CYCLOBENZAPRINE HYDROCHLORIDE 10 MG: 10 TABLET, FILM COATED ORAL at 03:28

## 2020-03-10 RX ADMIN — KETOROLAC TROMETHAMINE 15 MG: 30 INJECTION, SOLUTION INTRAMUSCULAR at 03:28

## 2020-03-10 NOTE — ED NOTES
Patient reports that his pain is gone after receiving the medication       Angelique Meehan RN  03/10/20 2451

## 2020-03-10 NOTE — ED PROVIDER NOTES
History  Chief Complaint   Patient presents with    Flank Pain     pt c/o pain in left side that started about 1 hour ago  denies any other symptoms  39 y/o male c/o L sided musculoskeletal chest wall pain which began last night into this morning; denies cough, dyspnea, rash, strenuous activity, or prior trauma to area  Denies nausea/vomiting; no prior history of nephrolithiasis  Denies fever, chills, and/or dysuria  Patient states that he took ibuprofen for his pain with little relief  Pain does not worsen with movement  Pain does not radiate; described as "sharp/stabbing" pain that is intermittent  Patient experienced similar pain on 4/10/17 and was evaluated in the ED  Prior to Admission Medications   Prescriptions Last Dose Informant Patient Reported? Taking?   acetaminophen (TYLENOL 8 HOUR) 650 mg CR tablet Unknown at Unknown time Self Yes No   Sig: Take by mouth   albuterol (PROVENTIL HFA,VENTOLIN HFA) 90 mcg/act inhaler More than a month at Unknown time  No No   Sig: Inhale 2 puffs every 6 (six) hours as needed for wheezing   fluticasone (FLONASE) 50 mcg/act nasal spray Past Week at Unknown time  No Yes   Si spray into each nostril daily   fluticasone-salmeterol (ADVAIR) 100-50 mcg/dose inhaler Not Taking at Unknown time Self No No   Sig: Inhale 1 puff 2 (two) times a day Rinse mouth after use     Patient not taking: Reported on 2020   ibuprofen (MOTRIN) 600 mg tablet 3/9/2020 at Unknown time  Yes Yes   Sig: Take 600 mg by mouth every 6 (six) hours as needed for mild pain   nicotine (NICODERM CQ) 14 mg/24hr TD 24 hr patch Not Taking at Unknown time Self No No   Sig: Place 1 patch on the skin every 24 hours   Patient not taking: Reported on 2020   nicotine polacrilex (NICORETTE) 4 mg gum Not Taking at Unknown time Self No No   Sig: Chew 1 each (4 mg total) as needed for smoking cessation   Patient not taking: Reported on 2020      Facility-Administered Medications: None Past Medical History:   Diagnosis Date    Asthma        Past Surgical History:   Procedure Laterality Date    HIP FRACTURE SURGERY      HIP SURGERY Right     screws and plate    HIP SURGERY      JOINT REPLACEMENT      STOMACH SURGERY      TOTAL HIP ARTHROPLASTY         Family History   Problem Relation Age of Onset    Asthma Maternal Uncle     Skin cancer Father      I have reviewed and agree with the history as documented  E-Cigarette/Vaping     E-Cigarette/Vaping Substances     Social History     Tobacco Use    Smoking status: Current Every Day Smoker     Packs/day: 0 20     Types: Cigarettes    Smokeless tobacco: Never Used   Substance Use Topics    Alcohol use: No    Drug use: Not Currently       Review of Systems   Cardiovascular: Positive for chest pain  Gastrointestinal: Positive for abdominal pain  All other systems reviewed and are negative  Physical Exam  Physical Exam   Constitutional: He is oriented to person, place, and time  He appears well-developed and well-nourished  HENT:   Head: Normocephalic and atraumatic  Eyes: Pupils are equal, round, and reactive to light  Conjunctivae and EOM are normal    Neck: Normal range of motion  Neck supple  Cardiovascular: Normal rate, regular rhythm and normal heart sounds  Pulmonary/Chest: Effort normal and breath sounds normal  He exhibits tenderness  Abdominal: Soft  Bowel sounds are normal  There is no tenderness  Musculoskeletal: Normal range of motion  Neurological: He is alert and oriented to person, place, and time  Skin: Skin is warm and dry  Capillary refill takes less than 2 seconds  Psychiatric: He has a normal mood and affect  Vitals reviewed        Vital Signs  ED Triage Vitals [03/10/20 0245]   Temperature Pulse Respirations Blood Pressure SpO2   (!) 97 °F (36 1 °C) 74 20 122/66 100 %      Temp Source Heart Rate Source Patient Position - Orthostatic VS BP Location FiO2 (%)   Tympanic Monitor Sitting Left arm --      Pain Score       No Pain           Vitals:    03/10/20 0245   BP: 122/66   Pulse: 74   Patient Position - Orthostatic VS: Sitting         Visual Acuity      ED Medications  Medications   ketorolac (TORADOL) injection 15 mg (15 mg Intramuscular Given 3/10/20 0328)   cyclobenzaprine (FLEXERIL) tablet 10 mg (10 mg Oral Given 3/10/20 0328)       Diagnostic Studies  Results Reviewed     Procedure Component Value Units Date/Time    Urine Microscopic [[id number]]  (Abnormal) Collected:  03/10/20 0307    Lab Status:  Final result Specimen:  Urine, Clean Catch Updated:  03/10/20 0328     RBC, UA 4-10 /hpf      WBC, UA 2-4 /hpf      Epithelial Cells Occasional /hpf      Bacteria, UA Occasional /hpf      MUCUS THREADS Occasional    UA (URINE) with reflex to Scope [[id number]]  (Abnormal) Collected:  03/10/20 0307    Lab Status:  Final result Specimen:  Urine, Clean Catch Updated:  03/10/20 0323     Color, UA Yellow     Clarity, UA Clear     Specific [place], UA 1 030     pH, UA 5 0     Leukocytes, UA 25 0     Nitrite, UA Negative     Protein, UA 15 (Trace) mg/dl      Glucose, UA Negative mg/dl      Ketones, UA 5 (Trace) mg/dl      Bilirubin, UA Negative     Blood,  0     UROBILINOGEN UA 1 0 mg/dL                  CT renal stone study abdomen pelvis without contrast    (Results Pending)              Procedures  Procedures         ED Course               PERC Rule for PE      Most Recent Value   PERC Rule for PE   Age >=50  0 Filed at: 03/10/2020 0305   HR >=100  0 Filed at: 03/10/2020 0305   O2 Sat on room air < 95%  0 Filed at: 03/10/2020 0305   History of PE or DVT  0 Filed at: 03/10/2020 0305   Recent trauma or surgery  0 Filed at: 03/10/2020 0305   Hemoptysis  0 Filed at: 03/10/2020 0305   Exogenous estrogen  0 Filed at: 03/10/2020 0305   Unilateral leg swelling  0 Filed at: 03/10/2020 0305   PERC Rule for PE Results  0 Filed at: 03/10/2020 0305                      MDM      Disposition  Final diagnoses:   Nephrolithiasis     Time reflects when diagnosis was documented in both MDM as applicable and the Disposition within this note     Time User Action Codes Description Comment    3/10/2020  4:18 AM Bruce Cevallos Add [N20 0] Nephrolithiasis       ED Disposition     ED Disposition Condition Date/Time Comment    Discharge Stable Tue Mar 10, 2020  4:18 AM Cindy Nick  discharge to home/self care  Follow-up Information     Follow up With Specialties Details Why Micah Gonzalez MD Family Medicine Schedule an appointment as soon as possible for a visit in 2 days  [facility] [address]             Patient's Medications   Discharge Prescriptions    OXYCODONE-ACETAMINOPHEN (PERCOCET) 5-325 MG PER TABLET    Take 1 tablet by mouth every 4 (four) hours as needed for moderate pain for up to 10 daysMax Daily Amount: 6 tablets       Start Date: 3/10/2020 End Date: 3/20/2020       Order Dose: 1 tablet       Quantity: 10 tablet    Refills: 0    TAMSULOSIN (FLOMAX) 0 4 MG    Take 1 capsule (0 4 mg total) by mouth daily with dinner for 5 days       Start Date: 3/10/2020 End Date: 3/15/2020       Order Dose: 0 4 mg       Quantity: 5 capsule    Refills: 0     No discharge procedures on file      PDMP Review     None          ED Provider  Electronically Signed by           Madeleine Pires DO  03/10/20 4241

## 2020-03-11 LAB — GLUCOSE SERPL-MCNC: 204 MG/DL (ref 65–140)

## 2020-03-13 ENCOUNTER — OFFICE VISIT (OUTPATIENT)
Dept: FAMILY MEDICINE CLINIC | Facility: CLINIC | Age: 45
End: 2020-03-13

## 2020-03-13 ENCOUNTER — TELEPHONE (OUTPATIENT)
Dept: FAMILY MEDICINE CLINIC | Facility: CLINIC | Age: 45
End: 2020-03-13

## 2020-03-13 VITALS
BODY MASS INDEX: 27.67 KG/M2 | WEIGHT: 193.3 LBS | SYSTOLIC BLOOD PRESSURE: 128 MMHG | HEIGHT: 70 IN | RESPIRATION RATE: 18 BRPM | HEART RATE: 82 BPM | OXYGEN SATURATION: 97 % | DIASTOLIC BLOOD PRESSURE: 60 MMHG | TEMPERATURE: 97.7 F

## 2020-03-13 DIAGNOSIS — J45.20 MILD INTERMITTENT ASTHMA WITHOUT COMPLICATION: ICD-10-CM

## 2020-03-13 DIAGNOSIS — J06.9 VIRAL UPPER RESPIRATORY TRACT INFECTION: ICD-10-CM

## 2020-03-13 DIAGNOSIS — H61.22 IMPACTED CERUMEN OF LEFT EAR: ICD-10-CM

## 2020-03-13 DIAGNOSIS — J45.40 MODERATE PERSISTENT ASTHMA, UNSPECIFIED WHETHER COMPLICATED: ICD-10-CM

## 2020-03-13 DIAGNOSIS — J06.9 VIRAL UPPER RESPIRATORY TRACT INFECTION: Primary | ICD-10-CM

## 2020-03-13 PROCEDURE — 3008F BODY MASS INDEX DOCD: CPT | Performed by: FAMILY MEDICINE

## 2020-03-13 PROCEDURE — 99213 OFFICE O/P EST LOW 20 MIN: CPT | Performed by: FAMILY MEDICINE

## 2020-03-13 RX ORDER — DIPHENHYDRAMINE HCL 25 MG
25 TABLET ORAL EVERY 6 HOURS PRN
Qty: 30 TABLET | Refills: 0 | Status: SHIPPED | OUTPATIENT
Start: 2020-03-13 | End: 2021-02-18

## 2020-03-13 RX ORDER — ALBUTEROL SULFATE 90 UG/1
2 AEROSOL, METERED RESPIRATORY (INHALATION) EVERY 6 HOURS PRN
Qty: 1 INHALER | Refills: 2 | Status: SHIPPED | OUTPATIENT
Start: 2020-03-13

## 2020-03-13 RX ORDER — DIPHENHYDRAMINE HCL 25 MG
25 TABLET ORAL EVERY 6 HOURS PRN
Qty: 30 TABLET | Refills: 0 | Status: SHIPPED | OUTPATIENT
Start: 2020-03-13 | End: 2020-03-13 | Stop reason: SDUPTHER

## 2020-03-13 NOTE — TELEPHONE ENCOUNTER
Patient called screaming and yelling stating  He was seen today and not all the medications were sent to the pharmacy  He stated he needs his inhaler and a cough medicine    He was very angry and he wants his medicaitons asap

## 2020-03-13 NOTE — ASSESSMENT & PLAN NOTE
Cont w/ supportive mgmt; fluids and rest   Have advised Ibuprofen and/or Tylenol as needed for throat pain  Have also advised to use Carlo Jose G Med Sinus Rinse for sx relief of associated rhinitis  Have advised using Benadryl HS for relief of night time rhinitis and cough  F/u as needed

## 2020-03-13 NOTE — PROGRESS NOTES
Assessment/Plan: Moderate persistent asthma  Currently not in exacerbation  Cont w/ current mgmt; Advair BID and Albuterol as needed  Viral upper respiratory tract infection  Cont w/ supportive mgmt; fluids and rest   Have advised Ibuprofen and/or Tylenol as needed for throat pain  Have also advised to use Fernández Lui Med Sinus Rinse for sx relief of associated rhinitis  Have advised using Benadryl HS for relief of night time rhinitis and cough  F/u as needed  Impacted cerumen of left ear  Advise using Debrox BID to left ear for 1-2 weeks  Diagnoses and all orders for this visit:    Viral upper respiratory tract infection  -     diphenhydrAMINE (BENADRYL) 25 mg tablet; Take 1 tablet (25 mg total) by mouth every 6 (six) hours as needed for itching    Moderate persistent asthma, unspecified whether complicated    Impacted cerumen of left ear  -     carbamide peroxide (DEBROX) 6 5 % otic solution; Administer 5 drops into the left ear 2 (two) times a day          Subjective:      Patient ID: Rani Silver  is a 38 y o  male  Rani Silver  is a 38 y o  Male, presents for 1 day hx of throat pain, cough and rhinitis  He was working at a [employer] in [place] and felt the above sx after that  He denies associated fever, chills, n/v  The following portions of the patient's history were reviewed and updated as appropriate: He  has a past medical history of Asthma  Patient Active Problem List    Diagnosis Date Noted    Viral upper respiratory tract infection 03/13/2020    Impacted cerumen of left ear 03/13/2020    Lumbar radiculopathy 06/10/2019    Perineal numbness 05/28/2019    Acute midline low back pain 05/28/2019    Moderate persistent asthma 09/24/2018    Dependence on nicotine from cigarettes 03/01/2018    Nasal septal deviation 03/01/2018    Back pain 12/16/2014     He  has a past surgical history that includes Total hip arthroplasty;  Hip surgery (Right); Joint replacement; Stomach surgery; Hip fracture surgery; and Hip surgery  His family history includes Asthma in his maternal uncle; Skin cancer in his father  He  reports that he has been smoking cigarettes  He has been smoking about 0 20 packs per day  He has never used smokeless tobacco  He reports that he has current or past drug history  He reports that he does not drink alcohol  Current Outpatient Medications   Medication Sig Dispense Refill    acetaminophen (TYLENOL 8 HOUR) 650 mg CR tablet Take by mouth      albuterol (PROVENTIL HFA,VENTOLIN HFA) 90 mcg/act inhaler Inhale 2 puffs every 6 (six) hours as needed for wheezing 1 Inhaler 2    fluticasone (FLONASE) 50 mcg/act nasal spray 1 spray into each nostril daily 1 Bottle 2    fluticasone-salmeterol (ADVAIR) 100-50 mcg/dose inhaler Inhale 1 puff 2 (two) times a day Rinse mouth after use  1 Inhaler 0    ibuprofen (MOTRIN) 600 mg tablet Take 600 mg by mouth every 6 (six) hours as needed for mild pain      oxyCODONE-acetaminophen (PERCOCET) 5-325 mg per tablet Take 1 tablet by mouth every 4 (four) hours as needed for moderate pain for up to 10 daysMax Daily Amount: 6 tablets 10 tablet 0    tamsulosin (FLOMAX) 0 4 mg Take 1 capsule (0 4 mg total) by mouth daily with dinner for 5 days 5 capsule 0    carbamide peroxide (DEBROX) 6 5 % otic solution Administer 5 drops into the left ear 2 (two) times a day 15 mL 0    diphenhydrAMINE (BENADRYL) 25 mg tablet Take 1 tablet (25 mg total) by mouth every 6 (six) hours as needed for itching 30 tablet 0    nicotine (NICODERM CQ) 14 mg/24hr TD 24 hr patch Place 1 patch on the skin every 24 hours (Patient not taking: Reported on 2/27/2020) 21 patch 0    nicotine polacrilex (NICORETTE) 4 mg gum Chew 1 each (4 mg total) as needed for smoking cessation (Patient not taking: Reported on 2/27/2020) 120 each 0     No current facility-administered medications for this visit        Current Outpatient Medications on File Prior to Visit   Medication Sig    acetaminophen (TYLENOL 8 HOUR) 650 mg CR tablet Take by mouth    albuterol (PROVENTIL HFA,VENTOLIN HFA) 90 mcg/act inhaler Inhale 2 puffs every 6 (six) hours as needed for wheezing    fluticasone (FLONASE) 50 mcg/act nasal spray 1 spray into each nostril daily    fluticasone-salmeterol (ADVAIR) 100-50 mcg/dose inhaler Inhale 1 puff 2 (two) times a day Rinse mouth after use   ibuprofen (MOTRIN) 600 mg tablet Take 600 mg by mouth every 6 (six) hours as needed for mild pain    oxyCODONE-acetaminophen (PERCOCET) 5-325 mg per tablet Take 1 tablet by mouth every 4 (four) hours as needed for moderate pain for up to 10 daysMax Daily Amount: 6 tablets    tamsulosin (FLOMAX) 0 4 mg Take 1 capsule (0 4 mg total) by mouth daily with dinner for 5 days    nicotine (NICODERM CQ) 14 mg/24hr TD 24 hr patch Place 1 patch on the skin every 24 hours (Patient not taking: Reported on 2/27/2020)    nicotine polacrilex (NICORETTE) 4 mg gum Chew 1 each (4 mg total) as needed for smoking cessation (Patient not taking: Reported on 2/27/2020)     No current facility-administered medications on file prior to visit  He has No Known Allergies       Review of Systems   Constitutional: Negative for appetite change, diaphoresis and fever  HENT: Positive for rhinorrhea and sore throat  Negative for congestion and ear pain  Respiratory: Positive for cough  Negative for shortness of breath and wheezing  Cardiovascular: Negative for chest pain and palpitations  Gastrointestinal: Negative for abdominal pain, constipation and diarrhea  Neurological: Negative for dizziness and headaches           Objective:      /60 (BP Location: Left arm, Patient Position: Sitting, Cuff Size: Standard)   Pulse 82   Temp 97 7 °F (36 5 °C) (Temporal)   Resp 18   Ht 5' 9 75" (1 772 m)   Wt 87 7 kg (193 lb 4 8 oz)   SpO2 97%   BMI 27 93 kg/m²          Physical Exam   Constitutional: He appears well-developed and well-nourished  No distress  HENT:   Right Ear: External ear normal    Mouth/Throat: Oropharynx is clear and moist  No oropharyngeal exudate  Impacted cerumen of left ear  Erythematous nasal pharynx   Eyes: Conjunctivae and EOM are normal    Neck: Normal range of motion  Neck supple  Cardiovascular: Normal rate, regular rhythm and normal heart sounds  Pulmonary/Chest: Effort normal and breath sounds normal  No respiratory distress  He has no wheezes  Musculoskeletal: He exhibits no edema or tenderness  Neurological: He is alert  Skin: Skin is warm and dry  He is not diaphoretic  Vitals reviewed

## 2020-04-09 ENCOUNTER — TELEMEDICINE (OUTPATIENT)
Dept: FAMILY MEDICINE CLINIC | Facility: CLINIC | Age: 45
End: 2020-04-09

## 2020-04-09 DIAGNOSIS — H61.22 EXCESSIVE CERUMEN IN LEFT EAR CANAL: Primary | ICD-10-CM

## 2020-04-09 PROCEDURE — G2012 BRIEF CHECK IN BY MD/QHP: HCPCS | Performed by: FAMILY MEDICINE

## 2020-05-07 ENCOUNTER — TELEMEDICINE (OUTPATIENT)
Dept: FAMILY MEDICINE CLINIC | Facility: CLINIC | Age: 45
End: 2020-05-07

## 2020-05-07 VITALS — HEIGHT: 70 IN | BODY MASS INDEX: 27.93 KG/M2

## 2020-05-07 DIAGNOSIS — R22.2 LOCALIZED SWELLING, MASS AND LUMP, TRUNK: Primary | ICD-10-CM

## 2020-05-07 PROCEDURE — 99213 OFFICE O/P EST LOW 20 MIN: CPT | Performed by: FAMILY MEDICINE

## 2020-07-09 ENCOUNTER — OFFICE VISIT (OUTPATIENT)
Dept: FAMILY MEDICINE CLINIC | Facility: CLINIC | Age: 45
End: 2020-07-09

## 2020-07-09 VITALS
SYSTOLIC BLOOD PRESSURE: 112 MMHG | DIASTOLIC BLOOD PRESSURE: 70 MMHG | HEIGHT: 70 IN | OXYGEN SATURATION: 97 % | TEMPERATURE: 97.6 F | RESPIRATION RATE: 16 BRPM | BODY MASS INDEX: 27.63 KG/M2 | HEART RATE: 84 BPM | WEIGHT: 193 LBS

## 2020-07-09 DIAGNOSIS — H65.02 NON-RECURRENT ACUTE SEROUS OTITIS MEDIA OF LEFT EAR: Primary | ICD-10-CM

## 2020-07-09 PROCEDURE — 99213 OFFICE O/P EST LOW 20 MIN: CPT | Performed by: FAMILY MEDICINE

## 2020-07-09 PROCEDURE — 3008F BODY MASS INDEX DOCD: CPT | Performed by: FAMILY MEDICINE

## 2020-07-09 NOTE — PROGRESS NOTES
A/P:  1  Allergic Rhinitis with middle ear effusion: May use OTC loratidine, +/_ flonase  F/U 3 months, sooner as needed  38year old male with 14 day history of left ear pain  No F/C/N/V/congestion/rash/ear drainage/hearing loss/cough  No ill contacts  H/O AR      ROS:  GNRL: No fatigue, weight change  HEENT: No hearing loss, dizziness  PULM: No SOB/wheezing  NEURO: No dizziness    Vitals:    07/09/20 0907   BP: 112/70   Pulse: 84   Resp: 16   Temp: 97 6 °F (36 4 °C)   SpO2: 97%       HEENT: + effusion left ear, no erythema/cerumen

## 2020-10-08 ENCOUNTER — OFFICE VISIT (OUTPATIENT)
Dept: FAMILY MEDICINE CLINIC | Facility: CLINIC | Age: 45
End: 2020-10-08

## 2020-10-08 VITALS
HEIGHT: 70 IN | WEIGHT: 194.4 LBS | RESPIRATION RATE: 20 BRPM | SYSTOLIC BLOOD PRESSURE: 118 MMHG | HEART RATE: 74 BPM | OXYGEN SATURATION: 98 % | BODY MASS INDEX: 27.83 KG/M2 | DIASTOLIC BLOOD PRESSURE: 64 MMHG | TEMPERATURE: 95.7 F

## 2020-10-08 DIAGNOSIS — H65.02 NON-RECURRENT ACUTE SEROUS OTITIS MEDIA OF LEFT EAR: Primary | ICD-10-CM

## 2020-10-08 DIAGNOSIS — L74.9 DISORDER OF SWEAT GLANDS: ICD-10-CM

## 2020-10-08 PROCEDURE — 99213 OFFICE O/P EST LOW 20 MIN: CPT | Performed by: FAMILY MEDICINE

## 2020-10-08 PROCEDURE — 3725F SCREEN DEPRESSION PERFORMED: CPT | Performed by: FAMILY MEDICINE

## 2020-10-08 PROCEDURE — 3008F BODY MASS INDEX DOCD: CPT | Performed by: FAMILY MEDICINE

## 2020-11-05 ENCOUNTER — OFFICE VISIT (OUTPATIENT)
Dept: FAMILY MEDICINE CLINIC | Facility: CLINIC | Age: 45
End: 2020-11-05

## 2020-11-05 ENCOUNTER — TELEPHONE (OUTPATIENT)
Dept: FAMILY MEDICINE CLINIC | Facility: CLINIC | Age: 45
End: 2020-11-05

## 2020-11-05 VITALS
HEART RATE: 76 BPM | SYSTOLIC BLOOD PRESSURE: 124 MMHG | TEMPERATURE: 98.4 F | RESPIRATION RATE: 20 BRPM | HEIGHT: 70 IN | BODY MASS INDEX: 27.8 KG/M2 | DIASTOLIC BLOOD PRESSURE: 82 MMHG | WEIGHT: 194.2 LBS | OXYGEN SATURATION: 96 %

## 2020-11-05 DIAGNOSIS — F33.1 MODERATE EPISODE OF RECURRENT MAJOR DEPRESSIVE DISORDER (HCC): Primary | ICD-10-CM

## 2020-11-05 PROCEDURE — 3008F BODY MASS INDEX DOCD: CPT | Performed by: FAMILY MEDICINE

## 2020-11-05 PROCEDURE — 99213 OFFICE O/P EST LOW 20 MIN: CPT | Performed by: FAMILY MEDICINE

## 2020-11-05 PROCEDURE — 3725F SCREEN DEPRESSION PERFORMED: CPT | Performed by: FAMILY MEDICINE

## 2020-11-12 ENCOUNTER — TELEPHONE (OUTPATIENT)
Dept: FAMILY MEDICINE CLINIC | Facility: CLINIC | Age: 45
End: 2020-11-12

## 2020-11-17 ENCOUNTER — TELEPHONE (OUTPATIENT)
Dept: FAMILY MEDICINE CLINIC | Facility: CLINIC | Age: 45
End: 2020-11-17

## 2020-11-27 ENCOUNTER — PATIENT OUTREACH (OUTPATIENT)
Dept: FAMILY MEDICINE CLINIC | Facility: CLINIC | Age: 45
End: 2020-11-27

## 2020-12-16 ENCOUNTER — PATIENT OUTREACH (OUTPATIENT)
Dept: FAMILY MEDICINE CLINIC | Facility: CLINIC | Age: 45
End: 2020-12-16

## 2021-01-05 ENCOUNTER — PATIENT OUTREACH (OUTPATIENT)
Dept: FAMILY MEDICINE CLINIC | Facility: CLINIC | Age: 46
End: 2021-01-05

## 2021-01-05 NOTE — PROGRESS NOTES
DARIANA SMALLS contacted Pt to discuss [address] resources that were sent out previously  Pt stated that he is doing well at this time however he does not recall receiving referral options  DARIANA SMALLS will send referrals again  Pt expressed agreement to this plan

## 2021-02-18 ENCOUNTER — OFFICE VISIT (OUTPATIENT)
Dept: FAMILY MEDICINE CLINIC | Facility: CLINIC | Age: 46
End: 2021-02-18

## 2021-02-18 VITALS
HEIGHT: 70 IN | TEMPERATURE: 97.6 F | RESPIRATION RATE: 18 BRPM | HEART RATE: 94 BPM | OXYGEN SATURATION: 96 % | DIASTOLIC BLOOD PRESSURE: 92 MMHG | SYSTOLIC BLOOD PRESSURE: 140 MMHG | BODY MASS INDEX: 26.92 KG/M2 | WEIGHT: 188 LBS

## 2021-02-18 DIAGNOSIS — M54.50 ACUTE MIDLINE LOW BACK PAIN WITHOUT SCIATICA: Primary | ICD-10-CM

## 2021-02-18 PROCEDURE — 3008F BODY MASS INDEX DOCD: CPT | Performed by: FAMILY MEDICINE

## 2021-02-18 PROCEDURE — 99213 OFFICE O/P EST LOW 20 MIN: CPT | Performed by: FAMILY MEDICINE

## 2021-02-18 NOTE — PROGRESS NOTES
A/P:  1  Lumbago: No red flag symptoms  Continue moist heat, gentle stretching  RTC if pain worsens or neurological symptoms develop  F/U 3 months    HPI:  41year old male with several day history of low back pain  Pain is bilateral   No radiation of pain  No F/C/N/V/incontinence  No trauma  Rates pain as 2-4/10  Has used heating pad with some relief      ROS per HPI      Current Outpatient Medications:     acetaminophen (TYLENOL 8 HOUR) 650 mg CR tablet, Take by mouth, Disp: , Rfl:     albuterol (PROVENTIL HFA,VENTOLIN HFA) 90 mcg/act inhaler, Inhale 2 puffs every 6 (six) hours as needed for wheezing, Disp: 1 Inhaler, Rfl: 2    fluticasone-salmeterol (ADVAIR) 100-50 mcg/dose inhaler, Inhale 1 puff 2 (two) times a day Rinse mouth after use , Disp: 1 Inhaler, Rfl: 0    ibuprofen (MOTRIN) 600 mg tablet, Take 600 mg by mouth every 6 (six) hours as needed for mild pain, Disp: , Rfl:     carbamide peroxide (DEBROX) 6 5 % otic solution, Administer 5 drops into the left ear 2 (two) times a day (Patient not taking: Reported on 7/9/2020), Disp: 15 mL, Rfl: 0    diphenhydrAMINE (BENADRYL) 25 mg tablet, Take 1 tablet (25 mg total) by mouth every 6 (six) hours as needed for itching (Patient not taking: Reported on 7/9/2020), Disp: 30 tablet, Rfl: 0    fluticasone (FLONASE) 50 mcg/act nasal spray, 1 spray into each nostril daily (Patient not taking: Reported on 5/7/2020), Disp: 1 Bottle, Rfl: 2    nicotine (NICODERM CQ) 14 mg/24hr TD 24 hr patch, Place 1 patch on the skin every 24 hours (Patient not taking: Reported on 2/27/2020), Disp: 21 patch, Rfl: 0    nicotine polacrilex (NICORETTE) 4 mg gum, Chew 1 each (4 mg total) as needed for smoking cessation (Patient not taking: Reported on 2/27/2020), Disp: 120 each, Rfl: 0    tamsulosin (FLOMAX) 0 4 mg, Take 1 capsule (0 4 mg total) by mouth daily with dinner for 5 days, Disp: 5 capsule, Rfl: 0    Vitals:    02/18/21 0906   BP: 140/92   Pulse: 94   Resp: 18 Temp: 97 6 °F (36 4 °C)   SpO2: 96%     GNRL: WN, WD, NAD  BACK: + b/l lumbar paraspinal muscle TTP  NEURO: 5/5 b/l LE STR, normal gait

## 2021-03-25 ENCOUNTER — TELEPHONE (OUTPATIENT)
Dept: FAMILY MEDICINE CLINIC | Facility: CLINIC | Age: 46
End: 2021-03-25

## 2021-03-25 NOTE — TELEPHONE ENCOUNTER
Patient mother called my line inquiring about patient speaking w/ [address] therapist, stated she has been waiting since November  I explained, per chart review, therapist called a few times and calls were missed and rescheduled calls and were missed again  Patients mother stated she would like to again have patient be reached  I asked if she would like to be contacted w/ patient and she handed phone to patient who confirmed phone number on chart was correct and would like to reschedule missed [facility] consultation     Please advise  Thank You

## 2021-04-27 ENCOUNTER — TELEPHONE (OUTPATIENT)
Dept: BEHAVIORAL/MENTAL HEALTH CLINIC | Facility: CLINIC | Age: 46
End: 2021-04-27

## 2021-04-28 ENCOUNTER — TELEPHONE (OUTPATIENT)
Dept: BEHAVIORAL/MENTAL HEALTH CLINIC | Facility: CLINIC | Age: 46
End: 2021-04-28

## 2021-04-28 ENCOUNTER — DOCUMENTATION (OUTPATIENT)
Dept: BEHAVIORAL/MENTAL HEALTH CLINIC | Facility: CLINIC | Age: 46
End: 2021-04-28

## 2021-04-28 ENCOUNTER — SOCIAL WORK (OUTPATIENT)
Dept: BEHAVIORAL/MENTAL HEALTH CLINIC | Facility: CLINIC | Age: 46
End: 2021-04-28

## 2021-04-28 DIAGNOSIS — F32.0 CURRENT MILD EPISODE OF MAJOR DEPRESSIVE DISORDER, UNSPECIFIED WHETHER RECURRENT (HCC): Primary | ICD-10-CM

## 2021-04-28 DIAGNOSIS — F32.1 CURRENT MODERATE EPISODE OF MAJOR DEPRESSIVE DISORDER, UNSPECIFIED WHETHER RECURRENT (HCC): Primary | ICD-10-CM

## 2021-04-28 PROCEDURE — 90834 PSYTX W PT 45 MINUTES: CPT | Performed by: SOCIAL WORKER

## 2021-04-28 NOTE — TELEPHONE ENCOUNTER
This therapist attempted multiple times to reach out to [facility] for the virtual visit  However [facility] did not answer his phone  So the therapist contacted his mom Alexa Mendieta and updated her about the issue  She expressed understanding and planned to reach out to him  [place] reported that [place]  [facility] has a LD and keeps his emotions to self and then lets it out all  Therapist used active listening and validated her feelings  He assurred her that he would be able to help [facility] only if he is able to attend the virtual sessions  He provided her with his contact gl-[phone]

## 2021-04-28 NOTE — TELEPHONE ENCOUNTER
This therapist contacted Jose Ramon Santana and provided him with the information for the virtual visit today at [facility] agreed to attend the virtual visit at 1pm

## 2021-04-28 NOTE — PROGRESS NOTES
Assessment/Plan: Shyanne Hawkins presented in the virtual visit with a depressed mood and an irritable affect  He was oriented x 4 and was able to verbalize his feelings appropriately  He reported feelings depressed and angry mostly  How ever he denied suicidal or homicidal ideations  There are no diagnoses linked to this encounter  Subjective: I feel sad and angry all the times  My son Fredrick Litten is 13 and lives with his mom in [address] [facility] [facility]  She just wants the money for child support  She doesn't let him visit me and has been putting bad stuff in his mind about me  I live with girlfriend Edelmira Wade and our dtr Sona who is 1  Anjali uBbba has Taoism in our [place] and I want Fredrick Litten to attend  It is very important for Fredrick Litten to attend but his mom wont let him attend  I have not seen him since [place]  I pay too much on rent and I am on SSI/SSD  I am always feeling stressed and anxious about everything  Patient ID: Kaylene Treadwell  is a 41 y o  male  HPI- 1:40 to 2:40pm     Review of Systems      Objective:Zeeshan seems to be in the contemplation stage of motivation  He has multiple psychosocial issues that acts as the triggering and the perpetuating factor  He also seems to have multiple psychosocial barriers currently       Physical Exam

## 2021-05-11 ENCOUNTER — TELEPHONE (OUTPATIENT)
Dept: FAMILY MEDICINE CLINIC | Facility: CLINIC | Age: 46
End: 2021-05-11

## 2021-05-11 NOTE — TELEPHONE ENCOUNTER
This Therapist contacted Jose Ramon Santana and set up and appt for 5/12 at [address] [place] [address] confirmed the appt for the virtual visit

## 2021-05-13 ENCOUNTER — TELEMEDICINE (OUTPATIENT)
Dept: BEHAVIORAL/MENTAL HEALTH CLINIC | Facility: CLINIC | Age: 46
End: 2021-05-13

## 2021-05-13 ENCOUNTER — TELEPHONE (OUTPATIENT)
Dept: PSYCHIATRY | Facility: CLINIC | Age: 46
End: 2021-05-13

## 2021-05-13 DIAGNOSIS — F32.2 CURRENT SEVERE EPISODE OF MAJOR DEPRESSIVE DISORDER WITHOUT PSYCHOTIC FEATURES, UNSPECIFIED WHETHER RECURRENT (HCC): Primary | ICD-10-CM

## 2021-05-13 PROCEDURE — 90834 PSYTX W PT 45 MINUTES: CPT | Performed by: SOCIAL WORKER

## 2021-05-13 NOTE — PATIENT INSTRUCTIONS
Depression   WHAT YOU NEED TO KNOW:   Depression is a medical condition that causes feelings of sadness or hopelessness that do not go away  Depression may cause you to lose interest in things you used to enjoy  These feelings may interfere with your daily life  DISCHARGE INSTRUCTIONS:   Call your local emergency number (911 in the [address]) if:   · You think about harming yourself or someone else  · You have done something on purpose to hurt yourself  Call your therapist or doctor if:   · Your symptoms do not improve  · You cannot make it to your next appointment  · You have new symptoms  · You have questions or concerns about your condition or care  The following resources are available at any time to help you, if needed:   · [address] [place] [address]: 9-804.451.1321 (5-542-059-XIEK)     · Suicide Hotline: 0-415.849.8627 (5-223-ISDMLKO)     · For a list of international numbers: https://save org/find-help/international-resources/    Medicines:   · Antidepressants  may be given to improve or balance your mood  You may need to take this medicine for several weeks before you begin to feel better  · Take your medicine as directed  Contact your healthcare provider if you think your medicine is not helping or if you have side effects  Tell him of her if you are allergic to any medicine  Keep a list of the medicines, vitamins, and herbs you take  Include the amounts, and when and why you take them  Bring the list or the pill bottles to follow-up visits  Carry your medicine list with you in case of an emergency  Therapy  is often used together with medicine to relieve depression  Therapy is a way for you to talk about your feelings and anything that may be causing depression  Therapy can be done alone or in a group  It may also be done with family members or a significant other  Self-care:   · Get regular physical activity  Try to be active for 30 minutes, 3 to 5 days a week   Physical activity can help relieve depression  Work with your healthcare provider to develop a plan that you enjoy  It may help to ask someone to be active with you  · Create a regular sleep schedule  A routine can help you relax before bed  Listen to music, read, or do yoga  Try to go to bed and wake up at the same time every day  Sleep is important for emotional health  · Eat a variety of healthy foods  Healthy foods include fruits, vegetables, whole-grain breads, low-fat dairy products, lean meats, fish, and cooked beans  A healthy meal plan is low in fat, salt, and added sugar  · Do not drink alcohol or use drugs  Alcohol and drugs can make depression worse  Talk to your therapist or doctor if you need help quitting  Follow up with your healthcare provider as directed: Your healthcare provider will monitor your progress at follow-up visits  He or she will also monitor your medicine if you take antidepressants  Your healthcare provider will ask if the medicine is helping  Tell him or her about any side effects or problems you may have with your medicine  The type or amount of medicine may need to be changed  Write down your questions so you remember to ask them during your visits  © Copyright [address] [place] [address] Drive Information is for End User's use only and may not be sold, redistributed or otherwise used for commercial purposes  All illustrations and images included in CareNotes® are the copyrighted property of [employer]  or [facility] Ty August   The above information is an  only  It is not intended as medical advice for individual conditions or treatments  Talk to your doctor, nurse or pharmacist before following any medical regimen to see if it is safe and effective for you

## 2021-05-13 NOTE — TELEPHONE ENCOUNTER
Patient called our office that he did not receive a link for his virtual appointment today at 1  Advised to patient we sent the provider a message and that your office would need to contact him since you are not a part of our department at [facility]

## 2021-05-13 NOTE — BH TREATMENT PLAN
[facility] Integration Treatment Plan    Nancy Guzman   1975  Date of Treatment Plan: 4/28/2021    Treatment Goals (after each item selected, indicate outcome measures; (ie: as evidenced by)    [x] Reduce Risk Factors of: mismanaged emotions joshua anger   [x] Reduce Major Symptoms of: depression and anxiety   [x] Ameliorate Functional Impairments of: lack of interest and lack of energy   [x] Develop Coping Strategies to Address Stress of: anxiety and depression   [x] Stabilize (short term) Crisis of: financial stress, IPR conflicts    [x] Maintain (long term) Stabilization of Symptoms of: depression and anxiety   [] Medication Referral to:   [x] Maintain Sobriety:nicotine      Planned Interventions- Patient Participation (must be consistent with treatment goals):    [x] Assertiveness Training [x] Problem Solving Skills Training   [x] Anger Management [x] Solution Focused Techniques   [x] Affect Identification and Expression [x] Stress Management   [x] Cognitive Restructuring [x] Supportive Therapy   [x] Communication Training  [x] Self/Other [place] Training    [x] Grief Work  [x] Decision Options Exploration   [x] Imagery/Relaxation Training [] Decision Option Exploration   [] Parent Training  [x] Pattern Identification and Interruption       [x] Engage Significant Others in Treatment: Pts GF   [] Facilitate Decision Making Regarding:   [x] Explore/Monitor: suicidal ideations   [x] Teach Skills of: positive coping skills   [x] Educate Regarding: depression and anxiety   [] Assign Readings:   [] Referrals Planned:   [x] Use of Resources/Strengths: family support   [x] Preventative Strategies: positive distractions   [x] Obstacles to Change: changes in motivational level     Estimated Time Frames: 7/22/2021     Goal 1: to manage the depressive symptoms   Goal 2:  To manage anxiety and learn relaxation skills    I have been provided education on my primary diagnosis of:Current moderate episode of major depressive disorder, unspecified whether recurrent [facility])    My therapist and I have developed this plan together, and I am in agreement to working on these issues and goals  I understand the plan that has been developed for my treatment  [] Patient Declined copy of treatment plan

## 2021-05-13 NOTE — BH TREATMENT PLAN
[facility] Integration Treatment Plan    Syd Yu   1975  Date of Treatment Plan: 4/28/2021    Treatment Goals (after each item selected, indicate outcome measures; (ie: as evidenced by)    [x] Reduce Risk Factors of: mismanaged emotions joshua anger   [x] Reduce Major Symptoms of: depression and anxiety   [x] Ameliorate Functional Impairments of: lack of interest and lack of energy   [x] Develop Coping Strategies to Address Stress of: anxiety and depression   [x] Stabilize (short term) Crisis of: financial stress, IPR conflicts    [x] Maintain (long term) Stabilization of Symptoms of: depression and anxiety   [] Medication Referral to:   [x] Maintain Sobriety:nicotine      Planned Interventions- Patient Participation (must be consistent with treatment goals):    [x] Assertiveness Training [x] Problem Solving Skills Training   [x] Anger Management [x] Solution Focused Techniques   [x] Affect Identification and Expression [x] Stress Management   [x] Cognitive Restructuring [x] Supportive Therapy   [x] Communication Training  [x] Self/Other [place] Training    [x] Grief Work  [x] Decision Options Exploration   [x] Imagery/Relaxation Training [] Decision Option Exploration   [] Parent Training  [x] Pattern Identification and Interruption       [x] Engage Significant Others in Treatment: Pts GF   [] Facilitate Decision Making Regarding:   [x] Explore/Monitor: suicidal ideations   [x] Teach Skills of: positive coping skills   [x] Educate Regarding: depression and anxiety   [] Assign Readings:   [] Referrals Planned:   [x] Use of Resources/Strengths: family support   [x] Preventative Strategies: positive distractions   [x] Obstacles to Change: changes in motivational level     Estimated Time Frames: 7/22/2021     Goal 1: to manage the depressive symptoms   Goal 2:  To manage anxiety and learn relaxation skills    I have been provided education on my primary diagnosis of:Current severe episode of major depressive disorder without psychotic features, unspecified whether recurrent [facility])    My therapist and I have developed this plan together, and I am in agreement to working on these issues and goals  I understand the plan that has been developed for my treatment  [] Patient Declined copy of treatment plan

## 2021-05-13 NOTE — PSYCH
Assessment/Plan: Amilcar Carlos presented in the session with a depressed mood and affect  He was oriented x4 and verbalized his feelings appropriately  Plan is to see him weekly for psychotherapy  There are no diagnoses linked to this encounter  Subjective:I have been feeling sad and angry for a very long time  My son [facility] 13 and lives with his mom in [address]  She wants the child support but doesn't allow him to visit me  I didn't see [facility] since [place]  I live with my GF Ginger and our 1yr old dtr Julioakfionatan  We have arranged for her [employer] this weekend  I wanted Emilia Flores to be present but his mom is not allowing him to visit  She knows that it is very important for us  I really get mad and sad but cant do anything about it  Patient ID: Dedrick Matthews  is a 41 y o  male  HPI-1:40 to 2:40 pm    Review of Systems      Objective:Zeeshan seems to be motivated for virtual therapy sessions weekly  He is willing to work on the [employer] and help himself before her tries to resolve the multiple issues  The therapist was able to establish rapport and a therapeutic relationship with him         Physical Exam

## 2021-05-13 NOTE — PATIENT INSTRUCTIONS
Depression   WHAT YOU NEED TO KNOW:   Depression is a medical condition that causes feelings of sadness or hopelessness that do not go away  Depression may cause you to lose interest in things you used to enjoy  These feelings may interfere with your daily life  DISCHARGE INSTRUCTIONS:   Call your local emergency number (911 in the [address] [place] [address] Rd[address]) if:   · You think about harming yourself or someone else  · You have done something on purpose to hurt yourself  Call your therapist or doctor if:   · Your symptoms do not improve  · You cannot make it to your next appointment  · You have new symptoms  · You have questions or concerns about your condition or care  The following resources are available at any time to help you, if needed:   · [address] [place] [address]: [phone] (4-919-101-XDEN)     · Suicide Hotline: 9-957.747.2119 (0-468-SZMMKWZ)     · For a list of international numbers: https://save org/find-help/international-resources/    Medicines:   · Antidepressants  may be given to improve or balance your mood  You may need to take this medicine for several weeks before you begin to feel better  · Take your medicine as directed  Contact your healthcare provider if you think your medicine is not helping or if you have side effects  Tell him of her if you are allergic to any medicine  Keep a list of the medicines, vitamins, and herbs you take  Include the amounts, and when and why you take them  Bring the list or the pill bottles to follow-up visits  Carry your medicine list with you in case of an emergency  Therapy  is often used together with medicine to relieve depression  Therapy is a way for you to talk about your feelings and anything that may be causing depression  Therapy can be done alone or in a group  It may also be done with family members or a significant other  Self-care:   · Get regular physical activity  Try to be active for 30 minutes, 3 to 5 days a week   Physical activity can help relieve depression  Work with your healthcare provider to develop a plan that you enjoy  It may help to ask someone to be active with you  · Create a regular sleep schedule  A routine can help you relax before bed  Listen to music, read, or do yoga  Try to go to bed and wake up at the same time every day  Sleep is important for emotional health  · Eat a variety of healthy foods  Healthy foods include fruits, vegetables, whole-grain breads, low-fat dairy products, lean meats, fish, and cooked beans  A healthy meal plan is low in fat, salt, and added sugar  · Do not drink alcohol or use drugs  Alcohol and drugs can make depression worse  Talk to your therapist or doctor if you need help quitting  Follow up with your healthcare provider as directed: Your healthcare provider will monitor your progress at follow-up visits  He or she will also monitor your medicine if you take antidepressants  Your healthcare provider will ask if the medicine is helping  Tell him or her about any side effects or problems you may have with your medicine  The type or amount of medicine may need to be changed  Write down your questions so you remember to ask them during your visits  © Copyright [address] [place] [address] Drive Information is for End User's use only and may not be sold, redistributed or otherwise used for commercial purposes  All illustrations and images included in CareNotes® are the copyrighted property of [employer]  or [facility] Ty August   The above information is an  only  It is not intended as medical advice for individual conditions or treatments  Talk to your doctor, nurse or pharmacist before following any medical regimen to see if it is safe and effective for you

## 2021-05-20 ENCOUNTER — TELEMEDICINE (OUTPATIENT)
Dept: BEHAVIORAL/MENTAL HEALTH CLINIC | Facility: CLINIC | Age: 46
End: 2021-05-20

## 2021-05-20 DIAGNOSIS — F32.0 CURRENT MILD EPISODE OF MAJOR DEPRESSIVE DISORDER, UNSPECIFIED WHETHER RECURRENT (HCC): Primary | ICD-10-CM

## 2021-05-20 PROCEDURE — 90834 PSYTX W PT 45 MINUTES: CPT | Performed by: SOCIAL WORKER

## 2021-05-20 NOTE — BH TREATMENT PLAN
[facility] Integration Treatment Plan    Nathan Dee   1975  Date of Treatment Plan: 4/28/2021    Treatment Goals (after each item selected, indicate outcome measures; (ie: as evidenced by)    [x] Reduce Risk Factors of: mismanaged emotions joshua anger   [x] Reduce Major Symptoms of: depression and anxiety   [x] Ameliorate Functional Impairments of: lack of interest and lack of energy   [x] Develop Coping Strategies to Address Stress of: anxiety and depression   [x] Stabilize (short term) Crisis of: financial stress, IPR conflicts and financial stress    [x] Maintain (long term) Stabilization of Symptoms of: depression and anxiety   [] Medication Referral to:   [x] Maintain Sobriety:nicotine and marijuana      Planned Interventions- Patient Participation (must be consistent with treatment goals):    [x] Assertiveness Training [x] Problem Solving Skills Training   [x] Anger Management [x] Solution Focused Techniques   [x] Affect Identification and Expression [x] Stress Management   [x] Cognitive Restructuring [x] Supportive Therapy   [x] Communication Training  [x] Self/Other [place] Training    [x] Grief Work  [x] Decision Options Exploration   [x] Imagery/Relaxation Training [] Decision Option Exploration   [] Parent Training  [x] Pattern Identification and Interruption       [x] Engage Significant Others in Treatment: Pts GF   [] Facilitate Decision Making Regarding:   [x] Explore/Monitor: addictive thinking and behaviors   [x] Teach Skills of: positive coping and problem solving skills   [x] Educate Regarding: depression and anxiety   [] Assign Readings:   [] Referrals Planned:   [x] Use of Resources/Strengths: family support, good work skills   [x] Preventative Strategies: positive distractions   [x] Obstacles to Change: changes in motivational level     Estimated Time Frames: 7/22/2021     Goal 1: to manage the depressive symptoms   Goal 2:  To manage anxiety and learn relaxation skills    I have been provided education on my primary diagnosis of:Current severe episode of major depressive disorder without psychotic features, unspecified whether recurrent [facility])    My therapist and I have developed this plan together, and I am in agreement to working on these issues and goals  I understand the plan that has been developed for my treatment  [] Patient Declined copy of treatment plan

## 2021-05-20 NOTE — PSYCH
Assessment/Plan: Thiago Valdez presented in the session with an irritable mood and affect  He was able to verbalize his pent up emotions appropriately  He reported having depression and anxiety due to the past and current psychosocial issues  He continues to have motivation to make the changes in his life  He actively participates in the session and seems to be receptive to the interventions  Plan is to help him learn the positive coping skills and problems solving skills  There are no diagnoses linked to this encounter  Subjective: My dad was diagnosed with cancer last year  My sister who is just 32 and has lupus and was hospitalized  She has caring  but my mom has been stressed out  I am at 175 with my housing application  I can make only $12,00 per month  I pay 880 rent but if there is 5 weeks 11,020  I get $65 and my wife gets $406  The rent is going up and my income is the same  We get food stamps for $600  My sister who is a teacher buys the diapers  My mom and my sister help me with most of the stuff  Patient ID: Syd Yu  is a 41 y o  male  HPI- 2:50-3:40= 50 min    Review of Systems      Objective: Thiago Valdez was helped to learn the problem solving skills  He tried to focus on the discussion despite the distractions and technical issues with the teams  He seems motivated to learn and implement the positive coping skills  He also acknowledges the need for the problem solving skills  The multiple psychosocial issues seems to maintain his MH issues currently        Physical Exam

## 2021-05-21 NOTE — PATIENT INSTRUCTIONS
Depression   WHAT YOU NEED TO KNOW:   Depression is a medical condition that causes feelings of sadness or hopelessness that do not go away  Depression may cause you to lose interest in things you used to enjoy  These feelings may interfere with your daily life  DISCHARGE INSTRUCTIONS:   Call your local emergency number (911 in the [address] [place] [address] Rd,[address]) if:   · You think about harming yourself or someone else  · You have done something on purpose to hurt yourself  Call your therapist or doctor if:   · Your symptoms do not improve  · You cannot make it to your next appointment  · You have new symptoms  · You have questions or concerns about your condition or care  The following resources are available at any time to help you, if needed:   · [facility]: [phone] (3-834-635-EZRK)     · Suicide Hotline: 9-916.127.7971 (6-412-KRXWUKY)     · For a list of international numbers: https://save org/find-help/international-resources/    Medicines:   · Antidepressants  may be given to improve or balance your mood  You may need to take this medicine for several weeks before you begin to feel better  · Take your medicine as directed  Contact your healthcare provider if you think your medicine is not helping or if you have side effects  Tell him of her if you are allergic to any medicine  Keep a list of the medicines, vitamins, and herbs you take  Include the amounts, and when and why you take them  Bring the list or the pill bottles to follow-up visits  Carry your medicine list with you in case of an emergency  Therapy  is often used together with medicine to relieve depression  Therapy is a way for you to talk about your feelings and anything that may be causing depression  Therapy can be done alone or in a group  It may also be done with family members or a significant other  Self-care:   · Get regular physical activity  Try to be active for 30 minutes, 3 to 5 days a week   Physical activity can help relieve depression  Work with your healthcare provider to develop a plan that you enjoy  It may help to ask someone to be active with you  · Create a regular sleep schedule  A routine can help you relax before bed  Listen to music, read, or do yoga  Try to go to bed and wake up at the same time every day  Sleep is important for emotional health  · Eat a variety of healthy foods  Healthy foods include fruits, vegetables, whole-grain breads, low-fat dairy products, lean meats, fish, and cooked beans  A healthy meal plan is low in fat, salt, and added sugar  · Do not drink alcohol or use drugs  Alcohol and drugs can make depression worse  Talk to your therapist or doctor if you need help quitting  Follow up with your healthcare provider as directed: Your healthcare provider will monitor your progress at follow-up visits  He or she will also monitor your medicine if you take antidepressants  Your healthcare provider will ask if the medicine is helping  Tell him or her about any side effects or problems you may have with your medicine  The type or amount of medicine may need to be changed  Write down your questions so you remember to ask them during your visits  © Copyright [address] [place] [address] Drive Information is for End User's use only and may not be sold, redistributed or otherwise used for commercial purposes  All illustrations and images included in CareNotes® are the copyrighted property of [employer]  or [facility] Ty August   The above information is an  only  It is not intended as medical advice for individual conditions or treatments  Talk to your doctor, nurse or pharmacist before following any medical regimen to see if it is safe and effective for you

## 2021-05-27 ENCOUNTER — TELEMEDICINE (OUTPATIENT)
Dept: BEHAVIORAL/MENTAL HEALTH CLINIC | Facility: CLINIC | Age: 46
End: 2021-05-27

## 2021-05-27 DIAGNOSIS — F31.9 BIPOLAR DEPRESSION (HCC): Primary | ICD-10-CM

## 2021-05-27 PROCEDURE — 90834 PSYTX W PT 45 MINUTES: CPT | Performed by: SOCIAL WORKER

## 2021-05-27 NOTE — PSYCH
Assessment/Plan: Yue Perez presented in the session with an sad mood and affect  He was unable to join the session at 2pm but joined later at 2:40  He lives in a 1 bed room appt with his father in law, significant other and 3 yr old dtr  He attends his session from his bathroom  He continues to be motivated and participates actively despite the barriers or the distractions  There are no diagnoses linked to this encounter  Subjective: I didn't sleep well yesterday night  My dtr was cranky due to teething  I slept only for few hrs and then tried to take nap  She woke me up again and then I wasn't able to go back to sleeping  My mood goes up and down  Patient ID: Bipin Bello  is a 41 y o  male  HPI- 2:45 to 3:40- 55 min    Review of Systems      Objective: Yue Perez seems to be a victim of his environment  However he doesn't seem to act like a victim  He seems to be motivated to work on his [employer]  His personal goal is to move into a better appt with a better environment  Also to work part time to resolve his financial constraints  He is also willing to cut down on his smoking habit        Physical Exam

## 2021-05-28 NOTE — PATIENT INSTRUCTIONS
Depression   WHAT YOU NEED TO KNOW:   Depression is a medical condition that causes feelings of sadness or hopelessness that do not go away  Depression may cause you to lose interest in things you used to enjoy  These feelings may interfere with your daily life  DISCHARGE INSTRUCTIONS:   Call your local emergency number (911 in the [address]) if:   · You think about harming yourself or someone else  · You have done something on purpose to hurt yourself  Call your therapist or doctor if:   · Your symptoms do not improve  · You cannot make it to your next appointment  · You have new symptoms  · You have questions or concerns about your condition or care  The following resources are available at any time to help you, if needed:   · [address] [place] [address]: [phone] (2-915-451-SEXH)     · Suicide Hotline: 0-796.205.3439 (0-040-WYLHWED)     · For a list of international numbers: https://save org/find-help/international-resources/    Medicines:   · Antidepressants  may be given to improve or balance your mood  You may need to take this medicine for several weeks before you begin to feel better  · Take your medicine as directed  Contact your healthcare provider if you think your medicine is not helping or if you have side effects  Tell him of her if you are allergic to any medicine  Keep a list of the medicines, vitamins, and herbs you take  Include the amounts, and when and why you take them  Bring the list or the pill bottles to follow-up visits  Carry your medicine list with you in case of an emergency  Therapy  is often used together with medicine to relieve depression  Therapy is a way for you to talk about your feelings and anything that may be causing depression  Therapy can be done alone or in a group  It may also be done with family members or a significant other  Self-care:   · Get regular physical activity  Try to be active for 30 minutes, 3 to 5 days a week   Physical activity can help relieve depression  Work with your healthcare provider to develop a plan that you enjoy  It may help to ask someone to be active with you  · Create a regular sleep schedule  A routine can help you relax before bed  Listen to music, read, or do yoga  Try to go to bed and wake up at the same time every day  Sleep is important for emotional health  · Eat a variety of healthy foods  Healthy foods include fruits, vegetables, whole-grain breads, low-fat dairy products, lean meats, fish, and cooked beans  A healthy meal plan is low in fat, salt, and added sugar  · Do not drink alcohol or use drugs  Alcohol and drugs can make depression worse  Talk to your therapist or doctor if you need help quitting  Follow up with your healthcare provider as directed: Your healthcare provider will monitor your progress at follow-up visits  He or she will also monitor your medicine if you take antidepressants  Your healthcare provider will ask if the medicine is helping  Tell him or her about any side effects or problems you may have with your medicine  The type or amount of medicine may need to be changed  Write down your questions so you remember to ask them during your visits  © Copyright [address] [place] [address] Drive Information is for End User's use only and may not be sold, redistributed or otherwise used for commercial purposes  All illustrations and images included in CareNotes® are the copyrighted property of [employer]  or [facility] Ty August   The above information is an  only  It is not intended as medical advice for individual conditions or treatments  Talk to your doctor, nurse or pharmacist before following any medical regimen to see if it is safe and effective for you

## 2021-05-28 NOTE — BH TREATMENT PLAN
[facility] Integration Treatment Plan    Diana Gaytan   1975  Date of Treatment Plan: 4/28/2021    Treatment Goals (after each item selected, indicate outcome measures; (ie: as evidenced by)    [x] Reduce Risk Factors of: mismanaged emotions joshua anger   [x] Reduce Major Symptoms of: depression and anxiety   [x] Ameliorate Functional Impairments of: lack of interest and lack of energy   [x] Develop Coping Strategies to Address Stress of: anxiety and depression   [x] Stabilize (short term) Crisis of: financial stress, IPR conflicts and financial stress    [x] Maintain (long term) Stabilization of Symptoms of: depression and anxiety   [] Medication Referral to:   [x] Maintain Sobriety:nicotine and marijuana      Planned Interventions- Patient Participation (must be consistent with treatment goals):    [x] Assertiveness Training [x] Problem Solving Skills Training   [x] Anger Management [x] Solution Focused Techniques   [x] Affect Identification and Expression [x] Stress Management   [x] Cognitive Restructuring [x] Supportive Therapy   [x] Communication Training  [x] Self/Other [place] Training    [x] Grief Work  [x] Decision Options Exploration   [x] Imagery/Relaxation Training [] Decision Option Exploration   [] Parent Training  [x] Pattern Identification and Interruption       [x] Engage Significant Others in Treatment: Pts GF   [] Facilitate Decision Making Regarding:   [x] Explore/Monitor: addictive thinking and behaviors   [x] Teach Skills of: positive coping and problem solving skills   [x] Educate Regarding: depression and anxiety   [] Assign Readings:   [] Referrals Planned:   [x] Use of Resources/Strengths: family support, good work skills   [x] Preventative Strategies: positive distractions   [x] Obstacles to Change: changes in motivational level     Estimated Time Frames: 7/22/2021     Goal 1: to manage the depressive symptoms   Goal 2:  To manage anxiety and learn relaxation skills    I have been provided education on my primary diagnosis of:Current severe episode of major depressive disorder without psychotic features, unspecified whether recurrent [facility])    My therapist and I have developed this plan together, and I am in agreement to working on these issues and goals  I understand the plan that has been developed for my treatment  [] Patient Declined copy of treatment plan

## 2021-06-03 ENCOUNTER — TELEMEDICINE (OUTPATIENT)
Dept: BEHAVIORAL/MENTAL HEALTH CLINIC | Facility: CLINIC | Age: 46
End: 2021-06-03

## 2021-06-03 DIAGNOSIS — F33.1 MODERATE EPISODE OF RECURRENT MAJOR DEPRESSIVE DISORDER (HCC): Primary | ICD-10-CM

## 2021-06-03 PROCEDURE — 90834 PSYTX W PT 45 MINUTES: CPT | Performed by: SOCIAL WORKER

## 2021-06-03 NOTE — PSYCH
Assessment/Plan: Jeanne Alvarez presented in the session with a sad mood and affect  He tried to participate actively despite the barriers and the distractions  He denies depression, mood swings and the anxiety currently  He seems to rely on the cigarettes (regularly) and marijuana occasionally for stress/pain mgmt  There are no diagnoses linked to this encounter  Subjective: My mood has been stable and I feel much better today  My sleep has been poor because of my leg and the back pain  I got bolts and pins in it so my pain goes up when it rains or when its cold  The pain keeps me awake at night and the mattress is not the right one for me  I am 176 on the waiting list for the housing  My father and sister are doing well  My dtr will be baptized on the 15 this month but I am already broke  My mom and one of my sister help me with some of the stuff  Patient ID: Tima Adames  is a 41 y o  male  HPI- 2:55 to 3:40- 45 min    Review of Systems      Objective: Jeanne Alvarez was smoking a cigarette during the session  He reported that smoking calms his nerves and helps with his pain  He denied any depression, anxiety or the mood swings  He reported the following stressors: 1) pain mgmt issues due to which he has poor sleep and was almost having a fall  2) financial constraints, ie living on the welfare check to check  3) stressful living condition  Jeanne Alvarez lives in a small one bedroom appt with his significant other, 3 yr old dtr and father in law         Physical Exam

## 2021-06-03 NOTE — BH TREATMENT PLAN
[facility] Integration Treatment Plan    Bipin Bello   1975  Date of Treatment Plan: 4/28/2021    Treatment Goals (after each item selected, indicate outcome measures; (ie: as evidenced by)    [x] Reduce Risk Factors of: mismanaged emotions joshua anger- Yue Perez is able to mange the mood swings   [x] Reduce Major Symptoms of: depression and anxiety- He denies any depression and anxiety issues currently  [x] Ameliorate Functional Impairments of: he continues to have lack of energy   [x] Develop Coping Strategies to Address Stress of: financial and housing and the pain mgmt issue      [x] Stabilize (short term) Crisis of: continues to have financial stress and negative living situation    [x] Maintain (long term) Stabilization of Symptoms of: able to manage depression and anxiety   [] Medication Referral to:   [x] Maintain Sobriety:nicotine and marijuana- 100 cgs for 1-2 weeks      Planned Interventions- Patient Participation (must be consistent with treatment goals):    [] Assertiveness Training [x] Problem Solving Skills Training   [] Anger Management [x] Solution Focused Techniques   [] Affect Identification and Expression [x] Stress Management   [] Cognitive Restructuring [x] Supportive Therapy   [] Communication Training  [] Self/Other [place] Training    [] Grief Work  [x] Decision Options Exploration   [x] Imagery/Relaxation Training [] Decision Option Exploration   [] Parent Training  [x] Pattern Identification and Interruption       [] Engage Significant Others in Treatment:    [] Facilitate Decision Making Regarding:   [] Explore/Monitor: addictive thinking and behaviors   [x] Teach Skills of: positive coping and problem solving skills   [x] Educate Regarding: depression and anxiety   [] Assign Readings:   [] Referrals Planned:   [x] Use of Resources/Strengths: family support, good work skills   [x] Preventative Strategies: positive distractions   [x] Obstacles to Change: changes in motivational level Estimated Time Frames: 7/22/2021     Goal 1: to manage the depressive symptoms   Goal 2: To manage anxiety and learn relaxation skills    I have been provided education on my primary diagnosis of:Current severe episode of major depressive disorder without psychotic features, unspecified whether recurrent [facility])    My therapist and I have developed this plan together, and I am in agreement to working on these issues and goals  I understand the plan that has been developed for my treatment  [] Patient Declined copy of treatment plan

## 2021-06-03 NOTE — PATIENT INSTRUCTIONS
Depression   WHAT YOU NEED TO KNOW:   Depression is a medical condition that causes feelings of sadness or hopelessness that do not go away  Depression may cause you to lose interest in things you used to enjoy  These feelings may interfere with your daily life  DISCHARGE INSTRUCTIONS:   Call your local emergency number (911 in the [address]) if:   · You think about harming yourself or someone else  · You have done something on purpose to hurt yourself  Call your therapist or doctor if:   · Your symptoms do not improve  · You cannot make it to your next appointment  · You have new symptoms  · You have questions or concerns about your condition or care  The following resources are available at any time to help you, if needed:   · [address] [facility]: [phone] (7-573-618-[facility])     · Suicide Hotline: 9-925.376.7995 (3-054-NJEOXZZ)     · For a list of international numbers: https://save org/find-help/international-resources/    Medicines:   · Antidepressants  may be given to improve or balance your mood  You may need to take this medicine for several weeks before you begin to feel better  · Take your medicine as directed  Contact your healthcare provider if you think your medicine is not helping or if you have side effects  Tell him of her if you are allergic to any medicine  Keep a list of the medicines, vitamins, and herbs you take  Include the amounts, and when and why you take them  Bring the list or the pill bottles to follow-up visits  Carry your medicine list with you in case of an emergency  Therapy  is often used together with medicine to relieve depression  Therapy is a way for you to talk about your feelings and anything that may be causing depression  Therapy can be done alone or in a group  It may also be done with family members or a significant other  Self-care:   · Get regular physical activity  Try to be active for 30 minutes, 3 to 5 days a week   Physical activity can help relieve depression  Work with your healthcare provider to develop a plan that you enjoy  It may help to ask someone to be active with you  · Create a regular sleep schedule  A routine can help you relax before bed  Listen to music, read, or do yoga  Try to go to bed and wake up at the same time every day  Sleep is important for emotional health  · Eat a variety of healthy foods  Healthy foods include fruits, vegetables, whole-grain breads, low-fat dairy products, lean meats, fish, and cooked beans  A healthy meal plan is low in fat, salt, and added sugar  · Do not drink alcohol or use drugs  Alcohol and drugs can make depression worse  Talk to your therapist or doctor if you need help quitting  Follow up with your healthcare provider as directed: Your healthcare provider will monitor your progress at follow-up visits  He or she will also monitor your medicine if you take antidepressants  Your healthcare provider will ask if the medicine is helping  Tell him or her about any side effects or problems you may have with your medicine  The type or amount of medicine may need to be changed  Write down your questions so you remember to ask them during your visits  © Copyright [address] [place] [address] Drive Information is for End User's use only and may not be sold, redistributed or otherwise used for commercial purposes  All illustrations and images included in CareNotes® are the copyrighted property of [employer]  or [facility] Ty August   The above information is an  only  It is not intended as medical advice for individual conditions or treatments  Talk to your doctor, nurse or pharmacist before following any medical regimen to see if it is safe and effective for you

## 2021-06-14 ENCOUNTER — TELEPHONE (OUTPATIENT)
Dept: BEHAVIORAL/MENTAL HEALTH CLINIC | Facility: CLINIC | Age: 46
End: 2021-06-14

## 2021-06-14 NOTE — TELEPHONE ENCOUNTER
This therapist contacted Dustin Rodrigez and planned to set up a virtual visit for this week  Dustin Rodrigez verbalized in detail that his landlord has not fixed the water heater   He plans to have it fix and then contact the therapist

## 2021-06-15 ENCOUNTER — TELEPHONE (OUTPATIENT)
Dept: FAMILY MEDICINE CLINIC | Facility: CLINIC | Age: 46
End: 2021-06-15

## 2021-06-15 NOTE — TELEPHONE ENCOUNTER
This therapist received a phone call from Paty who verbalized in detail about multiple psychosocial issues  He verbalized about lack of hot water and poor maintenance in his 1br apt  He complained in detail about family members calling him crazy  He was having an anger episode and stated that he had lost a baby 2 yrs ago due to which he became emotional      Therapist also spoke with his mom Phoebe Garcia who stated that Paty needs to be on the medications to manage his mood swings  She requested the therapist to provide marriage counseling to Paty and his girlfriend  Therapist used active listening and validated their feelings  He set up an appt for virtual visit with Ava on 6/16 at 11am  Total time of the phone call was 34:37 min

## 2021-06-18 ENCOUNTER — TELEPHONE (OUTPATIENT)
Dept: FAMILY MEDICINE CLINIC | Facility: CLINIC | Age: 46
End: 2021-06-18

## 2021-06-18 ENCOUNTER — TELEMEDICINE (OUTPATIENT)
Dept: BEHAVIORAL/MENTAL HEALTH CLINIC | Facility: CLINIC | Age: 46
End: 2021-06-18

## 2021-06-18 DIAGNOSIS — F32.2 CURRENT SEVERE EPISODE OF MAJOR DEPRESSIVE DISORDER WITHOUT PSYCHOTIC FEATURES, UNSPECIFIED WHETHER RECURRENT (HCC): Primary | ICD-10-CM

## 2021-06-18 NOTE — TELEPHONE ENCOUNTER
Patient is calling in requesting a pysch medication and also that he has a lump on his neck that he wants to get checked - Dr Crystal Tong doesn't have any availabilities and patient is refusing to see anyone else       If anyone can please contact this patient     Thank you ,

## 2021-06-18 NOTE — BH TREATMENT PLAN
[facility] Integration Treatment Plan    Antonietta Francis   1975  Date of Treatment Plan: 4/28/2021    Treatment Goals (after each item selected, indicate outcome measures; (ie: as evidenced by)    [x] Reduce Risk Factors of: mismanaged emotions joshua anger- Jelani Ramsey has mood swings- anger outbursts, irritability and sadness   [x] Reduce Major Symptoms of: depression and anxiety- He reports anger, depression and anxiety issues currently  [x] Ameliorate Functional Impairments of: he continues to have lack of energy and lack of interest in activitys   [x] Develop Coping Strategies to Address Stress of: financial and housing and the pain mgmt issue  - poor progress   [x] Stabilize (short term) Crisis of: continues to have financial stress and negative living situation - poor progress   [x] Maintain (long term) Stabilization of Symptoms of: able to manage depression and anxiety- poor progress   [] Medication Referral to:   [x] Maintain Sobriety:nicotine and marijuana- 100 cgs for 1-2 weeks- poor progress      Planned Interventions- Patient Participation (must be consistent with treatment goals):    [] Assertiveness Training [x] Problem Solving Skills Training- he was helped to learn the problem solving skills   [] Anger Management [x] Solution Focused Techniques- he is unable to focus due to multiple stressors   [] Affect Identification and Expression [x] Stress Management- currently feeling overwhelmed due to multiple psychosocial issues   [] Cognitive Restructuring [x] Supportive Therapy- therapist uses supportive psychotherapy and validates Rauls feelings      [] Communication Training  [] Self/Other [place] Training    [] Grief Work  [x] Decision Options Exploration- Therapist plans to teach him the pros and cons   [x] Imagery/Relaxation Training- Jelani Ramsey was suggested to practice deep breathing and the relaxation skills for 5-10 min daily [] Decision Option Exploration   [] Parent Training  [x] Pattern Identification and Interruption- to be discussed       [] Engage Significant Others in Treatment:    [] Facilitate Decision Making Regarding:   [] Explore/Monitor: addictive thinking and behaviors   [x] Teach Skills of: positive coping and problem solving skills   [x] Educate Regarding: depression and anxiety   [] Assign Readings:   [] Referrals Planned:   [x] Use of Resources/Strengths: family support, good work skills   [x] Preventative Strategies: positive distractions   [x] Obstacles to Change: changes in motivational level     Estimated Time Frames: 7/22/2021     Goal 1: to manage the depressive symptoms   Goal 2: To manage anxiety and learn relaxation skills    I have been provided education on my primary diagnosis of:Current severe episode of major depressive disorder without psychotic features, unspecified whether recurrent [facility])    My therapist and I have developed this plan together, and I am in agreement to working on these issues and goals  I understand the plan that has been developed for my treatment  [] Patient Declined copy of treatment plan

## 2021-06-18 NOTE — PATIENT INSTRUCTIONS
Depression   WHAT YOU NEED TO KNOW:   Depression is a medical condition that causes feelings of sadness or hopelessness that do not go away  Depression may cause you to lose interest in things you used to enjoy  These feelings may interfere with your daily life  DISCHARGE INSTRUCTIONS:   Call your local emergency number (911 in the [address]) if:   · You think about harming yourself or someone else  · You have done something on purpose to hurt yourself  Call your therapist or doctor if:   · Your symptoms do not improve  · You cannot make it to your next appointment  · You have new symptoms  · You have questions or concerns about your condition or care  The following resources are available at any time to help you, if needed:   · [address] [place] [address]: 5-980.305.9723 (8-779-189-UUOV)     · Suicide Hotline: 3-381.684.7525 (6-790-CINGRTK)     · For a list of international numbers: https://save org/find-help/international-resources/    Medicines:   · Antidepressants  may be given to improve or balance your mood  You may need to take this medicine for several weeks before you begin to feel better  · Take your medicine as directed  Contact your healthcare provider if you think your medicine is not helping or if you have side effects  Tell him of her if you are allergic to any medicine  Keep a list of the medicines, vitamins, and herbs you take  Include the amounts, and when and why you take them  Bring the list or the pill bottles to follow-up visits  Carry your medicine list with you in case of an emergency  Therapy  is often used together with medicine to relieve depression  Therapy is a way for you to talk about your feelings and anything that may be causing depression  Therapy can be done alone or in a group  It may also be done with family members or a significant other  Self-care:   · Get regular physical activity  Try to be active for 30 minutes, 3 to 5 days a week   Physical activity can help relieve depression  Work with your healthcare provider to develop a plan that you enjoy  It may help to ask someone to be active with you  · Create a regular sleep schedule  A routine can help you relax before bed  Listen to music, read, or do yoga  Try to go to bed and wake up at the same time every day  Sleep is important for emotional health  · Eat a variety of healthy foods  Healthy foods include fruits, vegetables, whole-grain breads, low-fat dairy products, lean meats, fish, and cooked beans  A healthy meal plan is low in fat, salt, and added sugar  · Do not drink alcohol or use drugs  Alcohol and drugs can make depression worse  Talk to your therapist or doctor if you need help quitting  Follow up with your healthcare provider as directed: Your healthcare provider will monitor your progress at follow-up visits  He or she will also monitor your medicine if you take antidepressants  Your healthcare provider will ask if the medicine is helping  Tell him or her about any side effects or problems you may have with your medicine  The type or amount of medicine may need to be changed  Write down your questions so you remember to ask them during your visits  © Copyright [address] [place] [address] Drive Information is for End User's use only and may not be sold, redistributed or otherwise used for commercial purposes  All illustrations and images included in CareNotes® are the copyrighted property of [employer]  or [facility] Ty August   The above information is an  only  It is not intended as medical advice for individual conditions or treatments  Talk to your doctor, nurse or pharmacist before following any medical regimen to see if it is safe and effective for you

## 2021-06-18 NOTE — PSYCH
Assessment/Plan: Shyanne Hawkins presented in the session with an irritable mood and affect  He verbalized mood swings, sadness to anger irritability and anxiety  He seems to valid problems due to which his symptoms have been exacerbated  He is motivated to take the psychotropic medications  Plans: encourage him to consult his PCP for the RX of medications  Help him learn the coping and the relaxation skills  There are no diagnoses linked to this encounter  Subjective: I have been feeling crazy because of so many things  My doctors office told me to talk to you  I don't have any medications to calm down  I cannot go to sleep and my mood is up and down  It all started when the hot water problem started in my appt  Tomorrow is my daughters [employer] at 5 am but the [facility] didn't answer our call  My mom and me don't get along well  My dtr was throwing up because she was around a lot of people  She was having an anxiety attack and my mom was repeating that its her stomach  I got mad at her and then she got mad at me  My sisters had to intervene and  us  My mood has been going up and down  My sleep has been poor because of my pain issues  Patient ID: Kaylene Treadwell  is a 41 y o  male  HPI- 3:02 to 3:48- 46 min    Review of Systems      Objective: Shyanne Hawkins has been feeling angry and upset since the hot water problem started in his appt  He has been having anger/irritability, anxiety and the mood swings since then  He continues to have the following psychosocial stressors:   1) non congenial living condition/atmosphere 2) lack of sleep, 3) IPR conflicts with family members, 4) pain mgmt issues, 5) financial constraints  He need the psychotropic medications to help him with his symptoms        Physical Exam

## 2021-06-21 NOTE — TELEPHONE ENCOUNTER
I can't force him to come in but Dr Neva Eason is not available until August and there is nothing I can do about that unfortunately  The pt is not currently prescribed anything for mental health reasons and I would highly recommend he come in to be evaluated sooner rather than later for both his mental health and the lump, even if it's with another available provider  Dr Neva Eason, could you please take a moment to call the patient and advise him to come in to be seen with someone else?

## 2021-06-22 NOTE — TELEPHONE ENCOUNTER
Hi!    Would he be able to come in this Thursday at 0830? I could see him then as an add-on patient  Thank you!     Dr Soham Golden

## 2021-06-23 ENCOUNTER — TELEMEDICINE (OUTPATIENT)
Dept: BEHAVIORAL/MENTAL HEALTH CLINIC | Facility: CLINIC | Age: 46
End: 2021-06-23

## 2021-06-23 DIAGNOSIS — F41.1 GENERALIZED ANXIETY DISORDER: ICD-10-CM

## 2021-06-23 DIAGNOSIS — F31.32 BIPOLAR DISORDER WITH MODERATE DEPRESSION (HCC): Primary | ICD-10-CM

## 2021-06-23 NOTE — BH TREATMENT PLAN
[facility] Integration Treatment Plan    Josue Kussmaul   1975  Date of Treatment Plan: 4/28/2021    Treatment Goals (after each item selected, indicate outcome measures; (ie: as evidenced by)    [x] Reduce Risk Factors of: mismanaged emotions joshua anger- Aron Collins has mood swings- anger outbursts, irritability and sadness = poor progress   [x] Reduce Major Symptoms of: depression and anxiety- He reports anger, depression and anxiety issues currently = poor progress    [x] Ameliorate Functional Impairments of: he continues to have lack of energy and lack of interest in activity's = poor progress   [x] Develop Coping Strategies to Address Stress of: financial and housing and the pain mgmt issue  - poor progress   [x] Stabilize (short term) Crisis of: continues to have financial stress and negative living situation - poor progress   [x] Maintain (long term) Stabilization of Symptoms of: able to manage depression and anxiety- poor progress   [] Medication Referral to:   [x] Maintain Sobriety:nicotine and marijuana- 100 cigarettes for 1-2 weeks- poor progress      Planned Interventions- Patient Participation (must be consistent with treatment goals):    [x] Assertiveness Training- Help him learn the assertiveness skills  [x] Problem Solving Skills Training- he was helped to learn the problem solving skills   [x] Anger Management- CBT and DBT skills [x] Solution Focused Techniques- he is unable to focus due to multiple stressors   [] Affect Identification and Expression [x] Stress Management- currently feeling overwhelmed due to multiple psychosocial issues   [] Cognitive Restructuring [x] Supportive Therapy- therapist uses supportive psychotherapy and validates Rauls feelings      [x] Communication Training = direct affective communication without the critical comments and the cursing [] Self/Other [place] Training    [x] Grief Work - grief therapy for the baby he has lost in 2019 [x] Decision Options Exploration- Therapist plans to teach him the pros and cons   [x] Imagery/Relaxation Training- Jelani Ramsey was suggested to practice deep breathing and the relaxation skills for 5-10 min daily [] Decision Option Exploration   [] Parent Training  [x] Pattern Identification and Interruption- to be discussed       [x] Engage Significant Others in Treatment: Jelani Ramsey was encouraged to participate in couple therapy session    [] Facilitate Decision Making Regarding:   [] Explore/Monitor: addictive thinking and behaviors   [x] Teach Skills of: positive coping and problem solving skills   [x] Educate Regarding: depression and anxiety   [] Assign Readings:   [] Referrals Planned:   [x] Use of Resources/Strengths: family support, good work skills   [x] Preventative Strategies: positive distractions   [x] Obstacles to Change: changes in motivational level     Estimated Time Frames: 7/28/2021     Goal 1: to manage the depressive symptoms   Goal 2: To manage anxiety and learn relaxation skills    I have been provided education on my primary diagnosis of:Current severe episode of major depressive disorder without psychotic features, unspecified whether recurrent [facility])    My therapist and I have developed this plan together, and I am in agreement to working on these issues and goals  I understand the plan that has been developed for my treatment  [] Patient Declined copy of treatment plan

## 2021-06-23 NOTE — PSYCH
Assessment/Plan: Amilcar Carlos presented in the session with a sad mood and affect  He seemed to be very calm and laid back today  His symptoms seems to be exacerbated due to the psychosocial triggers  His mood swings, sadness, anger, irritability and anxiety seemed to be under control  He is motivated to take the psychotropic medications  Plan: encourage him to consult his PCP regarding the need for the medications  Help him learn the coping and the relaxation skills  There are no diagnoses linked to this encounter  Subjective: I have the doctors appt tomorrow  I need my medications because I cant sleep  I get the mood swings and I feel snappy all the times  My anxiety kicks in when I am expecting something and it doesn't happen  I don't have any medications to calm myself down  I cannot go to sleep and my mood is up and down  My mood has been going up and down  My sleep has been poor because of my pain issues  I smoke about 6-7 cigarettes daily  The hot water issue has been resolved  I was homeless and used to sleep under the bridge  Our baby Marybeth was premature and  after she was born in   Get Cerrato was born in March last year  Patient ID: Dedrick Matthews  is a 41 y o  male  HPI- 10:55 to 10:40 = 50 min    Review of Systems      Objective: Amilcar Carlos was feeling much better today  He was calm and did not have any agitation or irritability  He reported anger, irritability, anxiety and the mood swings  He reports that its mainly due to the psychosocial stressors and due to the lack of psychotropic medications  He continues have the psychosocial issues such as: 1) non congenial living condition/atmosphere 2) lack of sleep, 3) IPR conflicts with family members, 4) pain mgmt issues, 5) financial constraints         Physical Exam

## 2021-06-24 ENCOUNTER — OFFICE VISIT (OUTPATIENT)
Dept: FAMILY MEDICINE CLINIC | Facility: CLINIC | Age: 46
End: 2021-06-24

## 2021-06-24 VITALS
WEIGHT: 180 LBS | SYSTOLIC BLOOD PRESSURE: 112 MMHG | BODY MASS INDEX: 25.83 KG/M2 | RESPIRATION RATE: 16 BRPM | TEMPERATURE: 97.7 F | OXYGEN SATURATION: 99 % | HEART RATE: 68 BPM | DIASTOLIC BLOOD PRESSURE: 70 MMHG

## 2021-06-24 DIAGNOSIS — F33.1 MODERATE EPISODE OF RECURRENT MAJOR DEPRESSIVE DISORDER (HCC): Primary | ICD-10-CM

## 2021-06-24 PROCEDURE — 99214 OFFICE O/P EST MOD 30 MIN: CPT | Performed by: FAMILY MEDICINE

## 2021-06-24 PROCEDURE — 1036F TOBACCO NON-USER: CPT | Performed by: FAMILY MEDICINE

## 2021-06-24 NOTE — PATIENT INSTRUCTIONS
Bipolar Disorder   WHAT YOU NEED TO KNOW:   Bipolar disorder is a long-term chemical imbalance that causes rapid changes in mood and behavior  High moods are called tanvi  Low moods are called depression  Sometimes you will feel manic and sometimes you will feel depressed  You can have alternating episodes of tanvi and depression  This is called a mixed bipolar state  DISCHARGE INSTRUCTIONS:   Contact your healthcare provider or psychiatrist if:   · You are having trouble managing your bipolar disorder  · You cannot sleep, or are sleeping all the time  · You cannot eat, or are eating more than usual     · You feel dizzy or your stomach is upset  · You cannot make it to your next meeting with your healthcare provider  · You have questions or concerns about your condition or care  Medicines:   · Medicines  may be given to help keep your mood stable, or to help you sleep  Changes in medicine are often needed as your bipolar disorder changes  · Take your medicine as directed  Contact your healthcare provider if you think your medicine is not helping or if you have side effects  Tell him or her if you are allergic to any medicine  Keep a list of the medicines, vitamins, and herbs you take  Include the amounts, and when and why you take them  Bring the list or the pill bottles to follow-up visits  Carry your medicine list with you in case of an emergency  Follow up with your healthcare provider or psychiatrist as directed: You may need to return for blood tests to monitor the levels of bipolar medicine in your blood  Manage bipolar disorder:  Watch for triggers of bipolar disorder symptoms, such as stress  Learn new ways to relax, such as deep breathing, to manage your stress  Tell someone if you feel a manic or depressive period might be coming on  Ask a friend or family member to help watch you for bipolar symptoms  Work to develop skills that will help you manage bipolar disorder   You may need to make lifestyle changes  Ask your healthcare provider or psychiatrist for resources  For support and more information:   · [address] ([address]), Office of Public [employer], Planning, and Communications  [address], [address] [address], [address] , [place] [address]   Phone: [phone]  Phone: [phone]  Web Address: Bernabe tn    · Depression and [address] ([address])  [address] [facility], [address] [place] [address] , 85[facility] [facility]  Phone: [phone]  Web Address: [facility] 6189 Information is for End User's use only and may not be sold, redistributed or otherwise used for commercial purposes  All illustrations and images included in CareNotes® are the copyrighted property of [employer]  or [address] [place] [address]  The above information is an  only  It is not intended as medical advice for individual conditions or treatments  Talk to your doctor, nurse or pharmacist before following any medical regimen to see if it is safe and effective for you

## 2021-06-24 NOTE — PROGRESS NOTES
A/P:  1  MDD, moderate, currently active: Continue counseling  Start sertraline 50 mg daily  SE d/w pt  Return parameters d/w pt  F/U 6-8 weeks  Spend 20 minutes with patient, >50% face-to-face counseling about MDD and treatment  HPI    41year old male with history of MDD, currently seeing counselor, who recommends starting medication  Patient has been on SSRI previously but did not think it helped then  Reports short temper, guilt, no hallucinations/delusions/SI/HI/violence  In a stable relationship  Has family support  + smoker  No illegal drug use  No manic episodes      PHQ-9: 13    ROS per HPI      Current Outpatient Medications:     acetaminophen (TYLENOL 8 HOUR) 650 mg CR tablet, Take by mouth, Disp: , Rfl:     albuterol (PROVENTIL HFA,VENTOLIN HFA) 90 mcg/act inhaler, Inhale 2 puffs every 6 (six) hours as needed for wheezing, Disp: 1 Inhaler, Rfl: 2    ibuprofen (MOTRIN) 600 mg tablet, Take 600 mg by mouth every 6 (six) hours as needed for mild pain, Disp: , Rfl:     sertraline (ZOLOFT) 50 mg tablet, Take 1 tablet (50 mg total) by mouth daily, Disp: 30 tablet, Rfl: 2    Vitals:    06/24/21 0824   BP: 112/70   Pulse: 68   Resp: 16   Temp: 97 7 °F (36 5 °C)   SpO2: 99%     GNRL: Depressed mood, normal speech content  PSYCH: No SI/HI

## 2021-06-30 ENCOUNTER — TELEMEDICINE (OUTPATIENT)
Dept: BEHAVIORAL/MENTAL HEALTH CLINIC | Facility: CLINIC | Age: 46
End: 2021-06-30

## 2021-06-30 DIAGNOSIS — F31.32 BIPOLAR DISORDER WITH MODERATE DEPRESSION (HCC): Primary | ICD-10-CM

## 2021-06-30 NOTE — BH TREATMENT PLAN
[facility] Integration Treatment Plan    Honorio Sotomayor   1975  Date of Treatment Plan: 4/28/2021    Treatment Goals (after each item selected, indicate outcome measures; (ie: as evidenced by)    [x] Reduce Risk Factors of: mismanaged emotions joshua anger- Tatyana Simmons denies mood swings- anger outbursts, irritability and sadness = good progress   [x] Reduce Major Symptoms of: depression and anxiety- He reports anger, depression and anxiety issues currently = good progress    [x] Ameliorate Functional Impairments of: he continues to have lack of energy but reports interest in activity's = fair progress   [x] Develop Coping Strategies to Address Stress of: financial and housing and the pain mgmt issue- He is able to cope with the stressors- fair progress   [x] Stabilize (short term) Crisis of: he is able to cope with the financial stress and negative living situation - fair progress   [x] Maintain (long term) Stabilization of Symptoms of: able to manage depression and anxiety- fair progress   [] Medication Referral to:   [x] Maintain Sobriety:nicotine and marijuana- 100 cigarettes for 1-2 weeks- poor progress      Planned Interventions- Patient Participation (must be consistent with treatment goals):    [x] Assertiveness Training- Tatyana Simmons was helped to learn the assertiveness skills- [x] Problem Solving Skills Training- he was helped to learn the problem solving skills   [x] Anger Management- CBT and DBT skills [x] Solution Focused Techniques- he is able to focus   [] Affect Identification and Expression [x] Stress Management- currently feeling overwhelmed due to multiple psychosocial issues   [] Cognitive Restructuring [x] Supportive Therapy- therapist uses supportive psychotherapy and validates Rauls feelings      [x] Communication Training = direct affective communication without the critical comments and the cursing [] Self/Other [place] Training    [x] Grief Work - grief therapy for the baby he has lost in 2019 [x] Decision Options Exploration- Therapist plans to teach him the pros and cons   [x] Imagery/Relaxation Training- Jesus Lauren was suggested to practice deep breathing and the relaxation skills for 5-10 min daily [] Decision Option Exploration   [] Parent Training  [x] Pattern Identification and Interruption- to be discussed       [x] Engage Significant Others in Treatment: Jesus Lauren was encouraged to participate in couple therapy session    [] Facilitate Decision Making Regarding:   [] Explore/Monitor: addictive thinking and behaviors   [x] Teach Skills of: positive coping and problem solving skills   [x] Educate Regarding: depression and anxiety   [] Assign Readings:   [] Referrals Planned:   [x] Use of Resources/Strengths: family support, good work skills   [x] Preventative Strategies: positive distractions   [x] Obstacles to Change: changes in motivational level     Estimated Time Frames: 7/28/2021     Goal 1: to manage the depressive symptoms   Goal 2: To manage anxiety and learn relaxation skills    I have been provided education on my primary diagnosis of:Current severe episode of major depressive disorder without psychotic features, unspecified whether recurrent [facility])    My therapist and I have developed this plan together, and I am in agreement to working on these issues and goals  I understand the plan that has been developed for my treatment  [] Patient Declined copy of treatment plan

## 2021-06-30 NOTE — PSYCH
Assessment/Plan: Leopold Ras presented in the session with an euthymic mood and a sad affect  He seemed to be calm and relaxed today  He denied mood swings, sadness, anger, irritability and anxiety  However he reported the side effects of the new medication  Plan: encourage him to consult his PCP regarding the side effects  Help him learn the coping and the relaxation skills  There are no diagnoses linked to this encounter  Subjective: I have been feeling better since I started my medication  I am on Zoloft 50 mgs once daily  It is helping me with the depression and the mood swings  I don't feel angry and on the edge anymore  However I am getting the side effects and don't like it  It messed up my stomach and I have been having diarrhea  I am getting the acid reflux and not able to hold the food  I have been feeling tired and sleepy  My sleep has improved and I go to bed early  I don't get the mood swings or feel snappy like I used to  My anxiety and the pain are under control  I have gained weight like 180 lbs  I used have these side effects when I was in [place]  I was on similar medications which helped me but I don't like the side effects  Patient ID: Agueda Soto  is a 41 y o  male  HPI- 11 to 11:50 = 50 min    Review of Systems      Objective: Leopold Ras was feeling much better today  He was calm and did not have any agitation or irritability  He reported that the Zoloft has helped him with his symptoms  However he has the side effects due to which he feel frustrated  He continues to have the psychosocial issues such as: 1) non congenial living condition- Leopold Ras lives with his significant other in a one bedroom apt 2) IPR conflicts with family members- arguments and fights with his mom and his siater and 3) financial constraints- He and his significant other are on SSI/SSD  They also get financial help from his mom and sister          Physical Exam

## 2021-07-01 NOTE — PATIENT INSTRUCTIONS
Bipolar Disorder   WHAT YOU NEED TO KNOW:   Bipolar disorder is a long-term chemical imbalance that causes rapid changes in mood and behavior  High moods are called tanvi  Low moods are called depression  Sometimes you will feel manic and sometimes you will feel depressed  You can have alternating episodes of tanvi and depression  This is called a mixed bipolar state  DISCHARGE INSTRUCTIONS:   Contact your healthcare provider or psychiatrist if:   · You are having trouble managing your bipolar disorder  · You cannot sleep, or are sleeping all the time  · You cannot eat, or are eating more than usual     · You feel dizzy or your stomach is upset  · You cannot make it to your next meeting with your healthcare provider  · You have questions or concerns about your condition or care  Medicines:   · Medicines  may be given to help keep your mood stable, or to help you sleep  Changes in medicine are often needed as your bipolar disorder changes  · Take your medicine as directed  Contact your healthcare provider if you think your medicine is not helping or if you have side effects  Tell him or her if you are allergic to any medicine  Keep a list of the medicines, vitamins, and herbs you take  Include the amounts, and when and why you take them  Bring the list or the pill bottles to follow-up visits  Carry your medicine list with you in case of an emergency  Follow up with your healthcare provider or psychiatrist as directed: You may need to return for blood tests to monitor the levels of bipolar medicine in your blood  Manage bipolar disorder:  Watch for triggers of bipolar disorder symptoms, such as stress  Learn new ways to relax, such as deep breathing, to manage your stress  Tell someone if you feel a manic or depressive period might be coming on  Ask a friend or family member to help watch you for bipolar symptoms  Work to develop skills that will help you manage bipolar disorder   You may need to make lifestyle changes  Ask your healthcare provider or psychiatrist for resources  For support and more information:   · [address] ([address]), Office of Public Service [employer], Planning, and Communications  [address], [address] [address], [facility] , [place] [address]   Phone: [phone]  Phone: [phone]  Web Address: Bernabe tn    · Depression and [address] ([address])  [address] [facility], [address] [place] [address] , [address] [address]  Phone: [phone]  Web Address: [facility] [address] Information is for End User's use only and may not be sold, redistributed or otherwise used for commercial purposes  All illustrations and images included in CareNotes® are the copyrighted property of [employer]  or [address] [place] [address]  The above information is an  only  It is not intended as medical advice for individual conditions or treatments  Talk to your doctor, nurse or pharmacist before following any medical regimen to see if it is safe and effective for you

## 2021-07-07 ENCOUNTER — TELEPHONE (OUTPATIENT)
Dept: FAMILY MEDICINE CLINIC | Facility: CLINIC | Age: 46
End: 2021-07-07

## 2021-07-07 ENCOUNTER — TELEMEDICINE (OUTPATIENT)
Dept: BEHAVIORAL/MENTAL HEALTH CLINIC | Facility: CLINIC | Age: 46
End: 2021-07-07

## 2021-07-07 DIAGNOSIS — F31.31 BIPOLAR AFFECTIVE DISORDER, CURRENTLY DEPRESSED, MILD (HCC): Primary | ICD-10-CM

## 2021-07-07 NOTE — PSYCH
Assessment/Plan: Stefanie Rosa presented in the session with an euthymic mood and affect  He seemed to be calm and relaxed today  He denied depression, mood swings, anger, irritability and anxiety  He tried to remain focused and goal oriented despite the distractions in the environment  Plan: Motivate him to use the DBT cope ahead skills and the relaxation skills  There are no diagnoses linked to this encounter  Subjective: I stopped taking the Zoloft because of the side effects  I am still awaiting a call back from the doctors office  I have been feeling better without it  I stopped taking Zoloft 50 mgs since July 1st  I don't feel depressed or anxious  My sleep and appetite is getting better now  I dont feel tired and sleepy anymore  My sleep has improved and I go to bed early  I don't get the mood swings or feel snappy like I used to  My anxiety and the pain are under control  I have gained weight like 185 lbs  I have to loose at least 25 to 30 lbs  The pain killers don't help me much and it is ineffective  I have been going out for walks and feel much better  I eat good and I try to spend time with my dtr  Patient ID: Narcisa Vazquez  is a 41 y o  male  HPI- 11 to 11:50 = 50 min    Review of Systems      Objective: Stefanie Rosa was feeling much better and denied depression or anger issues  He was calm and did not have any agitation  He quit taking the Zoloft due to its side effects  However he has been feeling much better  He also denies any stressful issues currently  He denied any arguments or fights with his partner and his mom  He is showing significant improvement in the session        Physical Exam

## 2021-07-07 NOTE — TELEPHONE ENCOUNTER
Pt left a message on the nurse line stating that the medication that was given to him recently is causing side effects  I called pt to clarify, pt stated that he was given zoloft 50mg is causing nauseas and shakiness, pt said he spoke to his therapist today which therapist advised that the strength might be to high for him and it should probably get decreased to 30mg or even 25mg  Pt also want it to mention that the medication is helping but is causing those side effects  Pt is asking for a called back

## 2021-07-07 NOTE — BH TREATMENT PLAN
[address] [facility] Integration Treatment Plan    Agueda Soto   1975  Date of Treatment Plan: 4/28/2021    Treatment Goals (after each item selected, indicate outcome measures; (ie: as evidenced by)    [x] Reduce Risk Factors of: mismanaged emotions joshua anger- Leopold Ras denies mood swings- anger outbursts, irritability and sadness = good progress   [x] Reduce Major Symptoms of: depression and anxiety- He denies anger, depression and anxiety issues currently = good progress    [x] Ameliorate Functional Impairments of: he continues to have lack of energy but reports interest in activity's = fair progress   [x] Develop Coping Strategies to Address Stress of: financial and housing and the pain mgmt issue- He is able to cope with the stressors- fair progress   [x] Stabilize (short term) Crisis of: he is able to cope with the financial stress and negative living situation - fair progress   [x] Maintain (long term) Stabilization of Symptoms of: able to manage depression and anxiety- fair progress   [] Medication Referral to:   [x] Maintain Sobriety:nicotine and marijuana- 100 cigarettes for 1-2 weeks- poor progress      Planned Interventions- Patient Participation (must be consistent with treatment goals):    [x] Assertiveness Training- Leopold Ras was helped to learn the assertiveness skills- [x] Problem Solving Skills Training- he was helped to learn the problem solving skills   [x] Anger Management- CBT and DBT skills [x] Solution Focused Techniques- he is able to focus   [] Affect Identification and Expression [x] Stress Management- currently feeling overwhelmed due to multiple psychosocial issues   [] Cognitive Restructuring [x] Supportive Therapy- therapist uses supportive psychotherapy and validates Rauls feelings      [x] Communication Training = direct affective communication without the critical comments and the cursing [] Self/Other [place] Training    [x] Grief Work - grief therapy for the baby he has lost in 2019 [x] Decision Options Exploration- Therapist plans to teach him the pros and cons   [x] Imagery/Relaxation Training- Any Marc was suggested to practice deep breathing and the relaxation skills for 5-10 min daily [] Decision Option Exploration   [] Parent Training  [x] Pattern Identification and Interruption- to be discussed       [x] Engage Significant Others in Treatment: Any Marc was encouraged to participate in couple therapy session    [] Facilitate Decision Making Regarding:   [] Explore/Monitor: addictive thinking and behaviors   [x] Teach Skills of: positive coping and problem solving skills   [x] Educate Regarding: depression and anxiety   [] Assign Readings:   [] Referrals Planned:   [x] Use of Resources/Strengths: family support, good work skills   [x] Preventative Strategies: positive distractions   [x] Obstacles to Change: changes in motivational level     Estimated Time Frames: 7/28/2021     Goal 1: to manage the depressive symptoms   Goal 2: To manage anxiety and learn relaxation skills    I have been provided education on my primary diagnosis of:Current severe episode of major depressive disorder without psychotic features, unspecified whether recurrent [facility])    My therapist and I have developed this plan together, and I am in agreement to working on these issues and goals  I understand the plan that has been developed for my treatment  [] Patient Declined copy of treatment plan

## 2021-07-07 NOTE — PATIENT INSTRUCTIONS
Bipolar Disorder   WHAT YOU NEED TO KNOW:   Bipolar disorder is a long-term chemical imbalance that causes rapid changes in mood and behavior  High moods are called tanvi  Low moods are called depression  Sometimes you will feel manic and sometimes you will feel depressed  You can have alternating episodes of tanvi and depression  This is called a mixed bipolar state  DISCHARGE INSTRUCTIONS:   Contact your healthcare provider or psychiatrist if:   · You are having trouble managing your bipolar disorder  · You cannot sleep, or are sleeping all the time  · You cannot eat, or are eating more than usual     · You feel dizzy or your stomach is upset  · You cannot make it to your next meeting with your healthcare provider  · You have questions or concerns about your condition or care  Medicines:   · Medicines  may be given to help keep your mood stable, or to help you sleep  Changes in medicine are often needed as your bipolar disorder changes  · Take your medicine as directed  Contact your healthcare provider if you think your medicine is not helping or if you have side effects  Tell him or her if you are allergic to any medicine  Keep a list of the medicines, vitamins, and herbs you take  Include the amounts, and when and why you take them  Bring the list or the pill bottles to follow-up visits  Carry your medicine list with you in case of an emergency  Follow up with your healthcare provider or psychiatrist as directed: You may need to return for blood tests to monitor the levels of bipolar medicine in your blood  Manage bipolar disorder:  Watch for triggers of bipolar disorder symptoms, such as stress  Learn new ways to relax, such as deep breathing, to manage your stress  Tell someone if you feel a manic or depressive period might be coming on  Ask a friend or family member to help watch you for bipolar symptoms  Work to develop skills that will help you manage bipolar disorder   You may need to make lifestyle changes  Ask your healthcare provider or psychiatrist for resources  For support and more information:   · [address] ([address]), Office of [employer], [employer] Communications  [address], [address] [address], [address] [facility] , [place] [address]   Phone: [phone]  Phone: [phone]  Web Address: [facility]    · Depression and [address] ([address])  [address] [place] [address], [address] [place] [address] , 85[facility] [place] [facility]  Phone: [phone]  Web Address: [place] [address] Information is for End User's use only and may not be sold, redistributed or otherwise used for commercial purposes  All illustrations and images included in CareNotes® are the copyrighted property of [employer] D A [employer]  or [address] [place] [address]  The above information is an  only  It is not intended as medical advice for individual conditions or treatments  Talk to your doctor, nurse or pharmacist before following any medical regimen to see if it is safe and effective for you

## 2021-07-08 ENCOUNTER — TELEPHONE (OUTPATIENT)
Dept: BEHAVIORAL/MENTAL HEALTH CLINIC | Facility: CLINIC | Age: 46
End: 2021-07-08

## 2021-07-08 NOTE — TELEPHONE ENCOUNTER
I called pt to informed   Pt proceed to start yelling at me and stating that he is not taking that medication because of the side effects that he is having that he has a 1 m/o baby and he cant take that mediation is too strong, I advised pt I placed the message to provider the same way as we spoke yesterday and that I understood pt just to continue to scream at me and said "I dont understand anything and he should be helping his wife, he also stated he will be here in 5 minutes that we will see" also pt states that "he will have his mother call the office"

## 2021-07-08 NOTE — TELEPHONE ENCOUNTER
Thank you! He can decrease his medication to 25 mg (1/2 tablet) per day, then follow up with me as scheduled

## 2021-07-08 NOTE — TELEPHONE ENCOUNTER
Dear Saul Barnes,    Please accept my sincere apology on behalf of Tom Orf  You do not deserve to spoken in this manner under any circumstances  I will call and speak to him      Dr Bright Carvalho

## 2021-07-09 ENCOUNTER — TELEPHONE (OUTPATIENT)
Dept: FAMILY MEDICINE CLINIC | Facility: CLINIC | Age: 46
End: 2021-07-09

## 2021-07-12 ENCOUNTER — TELEMEDICINE (OUTPATIENT)
Dept: BEHAVIORAL/MENTAL HEALTH CLINIC | Facility: CLINIC | Age: 46
End: 2021-07-12

## 2021-07-12 DIAGNOSIS — F31.31 BIPOLAR AFFECTIVE DISORDER, CURRENTLY DEPRESSED, MILD (HCC): Primary | ICD-10-CM

## 2021-07-12 NOTE — PATIENT INSTRUCTIONS
Bipolar Disorder   WHAT YOU NEED TO KNOW:   Bipolar disorder is a long-term chemical imbalance that causes rapid changes in mood and behavior  High moods are called tanvi  Low moods are called depression  Sometimes you will feel manic and sometimes you will feel depressed  You can have alternating episodes of tanvi and depression  This is called a mixed bipolar state  DISCHARGE INSTRUCTIONS:   Contact your healthcare provider or psychiatrist if:   · You are having trouble managing your bipolar disorder  · You cannot sleep, or are sleeping all the time  · You cannot eat, or are eating more than usual     · You feel dizzy or your stomach is upset  · You cannot make it to your next meeting with your healthcare provider  · You have questions or concerns about your condition or care  Medicines:   · Medicines  may be given to help keep your mood stable, or to help you sleep  Changes in medicine are often needed as your bipolar disorder changes  · Take your medicine as directed  Contact your healthcare provider if you think your medicine is not helping or if you have side effects  Tell him or her if you are allergic to any medicine  Keep a list of the medicines, vitamins, and herbs you take  Include the amounts, and when and why you take them  Bring the list or the pill bottles to follow-up visits  Carry your medicine list with you in case of an emergency  Follow up with your healthcare provider or psychiatrist as directed: You may need to return for blood tests to monitor the levels of bipolar medicine in your blood  Manage bipolar disorder:  Watch for triggers of bipolar disorder symptoms, such as stress  Learn new ways to relax, such as deep breathing, to manage your stress  Tell someone if you feel a manic or depressive period might be coming on  Ask a friend or family member to help watch you for bipolar symptoms  Work to develop skills that will help you manage bipolar disorder   You may need to make lifestyle changes  Ask your healthcare provider or psychiatrist for resources  For support and more information:   · [address] ([address]), Office of Public Service [place] [employer], Planning, and Communications  [address], [address] [address], [address] , [place] [address]   Phone: [phone]  Phone: [phone]  Web Address: Bernabe carmona    · Depression and [address] ([address])  [address]  [address] [facility], [address] [place] [address] , [address] [place]  Phone: [phone]  Web Address: [facility] [address] Information is for End User's use only and may not be sold, redistributed or otherwise used for commercial purposes  All illustrations and images included in CareNotes® are the copyrighted property of A D A [employer] [facility] [employer]  or [address] [place] [address]  The above information is an  only  It is not intended as medical advice for individual conditions or treatments  Talk to your doctor, nurse or pharmacist before following any medical regimen to see if it is safe and effective for you

## 2021-07-12 NOTE — BH TREATMENT PLAN
[facility] Integration Treatment Plan    Reyes Goyal   1975  Date of Treatment Plan: 4/28/2021    Treatment Goals (after each item selected, indicate outcome measures; (ie: as evidenced by)    [x] Reduce Risk Factors of: mismanaged emotions joshua anger- Sigrid Goldman continues to have mood swings- anger, irritability and  agitation = fluctuating progress   [x] Reduce Major Symptoms of: depression and anxiety- He has anger and anxiety issues currently = slow progress    [x] Ameliorate Functional Impairments of: he lacks energy and lacks interest in activity's = fair progress   [x] Develop Coping Strategies to Address Stress of: financial and housing and the pain mgmt issues- He is able to cope with the stressors- fair progress   [x] Stabilize (short term) Crisis of: he is able to cope with the financial stress and negative living situation - fair progress   [x] Maintain (long term) Stabilization of Symptoms of: able to manage depression and anxiety- fair progress   [] Medication Referral to:   [x] Maintain Sobriety:nicotine and marijuana- 100 cigarettes for 1-2 weeks- poor progress      Planned Interventions- Patient Participation (must be consistent with treatment goals):    [x] Assertiveness Training- Sigrid Goldman was helped to learn the assertiveness skills- [x] Problem Solving Skills Training- he was helped to learn the problem solving skills   [x] Anger Management- CBT and DBT skills [x] Solution Focused Techniques- he is able to focus   [] Affect Identification and Expression [x] Stress Management- currently feeling overwhelmed due to multiple psychosocial issues   [] Cognitive Restructuring [x] Supportive Therapy- therapist uses supportive psychotherapy and validates Rauls feelings      [x] Communication Training = direct affective communication without the critical comments and the cursing [] Self/Other [place] Training    [x] Grief Work - grief therapy for the baby he has lost in 2019 [x] Decision Options Exploration- Therapist plans to teach him the pros and cons   [x] Imagery/Relaxation Training- Francisco Marin was suggested to practice deep breathing and the relaxation skills for 5-10 min daily [] Decision Option Exploration   [] Parent Training  [x] Pattern Identification and Interruption- to be discussed       [x] Engage Significant Others in Treatment: Francisco Marin was encouraged to participate in couple therapy session    [] Facilitate Decision Making Regarding:   [] Explore/Monitor: addictive thinking and behaviors   [x] Teach Skills of: positive coping and problem solving skills   [x] Educate Regarding: depression and anxiety   [] Assign Readings:   [] Referrals Planned:   [x] Use of Resources/Strengths: family support, good work skills   [x] Preventative Strategies: positive distractions   [x] Obstacles to Change: changes in motivational level     Estimated Time Frames: 7/28/2021     Goal 1: to manage the depressive symptoms   Goal 2: To manage anxiety and learn relaxation skills    I have been provided education on my primary diagnosis of: Bipolar depression    My therapist and I have developed this plan together, and I am in agreement to working on these issues and goals  I understand the plan that has been developed for my treatment  [] Patient Declined copy of treatment plan

## 2021-07-12 NOTE — PSYCH
Assessment/Plan: Monica Harman presented in the session with an irritable mood and affect  He participated actively in the session and responded well to the intervention  He seemed to be upset about his living condition  He also expressed anger regarding the lack of psychiatric medications and the pain medications  He reported that he would like to change the doctor and the clinic out of frustration  He denied depression, mood swings, anger, irritability and anxiety  However he was having irritability while expressing his feelings  He wanted to remain focused and goal oriented but was distracted by his 3 yr old dtr  Plan: continue to encourage him to use the DBT cope ahead skills, distress tolerance and the relaxation skills  There are no diagnoses linked to this encounter  Subjective: I am going to see an apartment today  I am really fed up because my landlord is becoming greedy  They are charging 25 dollars just for a mail box key  I am done with place and want to move out soon  I took half of the Zoloft like the doctor suggested  I was shaking and had the diarrhea  I was also having the stomach pain  I stopped using it immediately and feel much better now  My sleep has improved but I get the mood swings  My pain goes up when it damp and cold  I feel frustrated because the pain killers don't help me  I am not getting the pills that help manage my pain  Patient ID: Tana Miranda  is a 41 y o  male  HPI- 11 to 11:50 = 50 min    Review of Systems      Objective: Monica Harman tends become upset due to any psychosocial triggers in his environment  He easily becomes upset and agitated  He quit taking the Zoloft due to its side effects  However he feels much better without it  He is motivated to take the psychotropic med's but is apprehensive about the side effects  He shows significant improvement few session and irritable/aggitated in several sessions        Physical Exam

## 2021-07-26 ENCOUNTER — TELEMEDICINE (OUTPATIENT)
Dept: BEHAVIORAL/MENTAL HEALTH CLINIC | Facility: CLINIC | Age: 46
End: 2021-07-26
Payer: COMMERCIAL

## 2021-07-26 DIAGNOSIS — F31.61 MILD MIXED BIPOLAR I DISORDER (HCC): Primary | ICD-10-CM

## 2021-07-26 PROCEDURE — 90832 PSYTX W PT 30 MINUTES: CPT | Performed by: SOCIAL WORKER

## 2021-07-26 NOTE — BH TREATMENT PLAN
[facility] Integration Treatment Plan    Yolanda Greenberg   1975  Date of Treatment Plan: 4/28/2021    Treatment Goals (after each item selected, indicate outcome measures; (ie: as evidenced by)    [x] Reduce Risk Factors of: mismanaged emotions joshua anger- Mariela Landon reports mild  irritability and anxiety = moderate progress   [x] Reduce Major Symptoms of: depression and anxiety- He reports mild irritability and anxiety = moderate progress    [x] Ameliorate Functional Impairments of: he lacks energy but has interest in activity's  = moderate progress   [x] Develop Coping Strategies to Address Stress of: financial, housing and pain mgmt issues- He is able to cope with the stressors- moderate progress   [x] Stabilize (short term) Crisis of: he is able to cope with the financial stress and negative living situation - moderate progress   [x] Maintain (long term) Stabilization of Symptoms of: He is able to manage depression and anxiety- moderate progress   [] Medication Referral to:   [x] Maintain Sobriety: nicotine and marijuana- 100 cigarettes for 1-2 weeks- poor progress      Planned Interventions- Patient Participation (must be consistent with treatment goals):    [x] Assertiveness Training- Mariela Landon was helped to learn the assertiveness skills- [x] Problem Solving Skills Training- he was helped to learn the problem solving skills   [x] Anger Management- CBT and DBT skills [x] Solution Focused Techniques- he is able to focus   [] Affect Identification and Expression [x] Stress Management- currently feeling overwhelmed due to multiple psychosocial issues   [] Cognitive Restructuring [x] Supportive Therapy- therapist uses supportive psychotherapy and validates Rauls feelings      [x] Communication Training = direct affective communication without the critical comments and the cursing [] Self/Other [place] Training    [x] Grief Work - grief therapy for the baby he has lost in 2019 [x] Decision Options Exploration- Therapist plans to teach him the pros and cons   [x] Imagery/Relaxation Training- Jamila Singh was suggested to practice deep breathing and the relaxation skills for 5-10 min daily [] Decision Option Exploration   [] Parent Training  [x] Pattern Identification and Interruption- to be discussed       [x] Engage Significant Others in Treatment: Jamila Singh was encouraged to participate in couple therapy session    [] Facilitate Decision Making Regarding:   [] Explore/Monitor: addictive thinking and behaviors   [x] Teach Skills of: positive coping and problem solving skills   [x] Educate Regarding: depression and anxiety   [] Assign Readings:   [] Referrals Planned:   [x] Use of Resources/Strengths: family support, good work skills   [x] Preventative Strategies: positive distractions   [x] Obstacles to Change: changes in motivational level     Estimated Time Frames: 7/28/2021     Goal 1: to manage the depression and the mood swings  Goal 2: To manage anxiety and learn relaxation skills    I have been provided education on my primary diagnosis of: Mild mixed Bipolar I d/o     My therapist and I have developed this plan together, and I am in agreement to working on these issues and goals  I understand the plan that has been developed for my treatment  [] Patient Declined copy of treatment plan

## 2021-07-26 NOTE — PATIENT INSTRUCTIONS
Bipolar Disorder   WHAT YOU NEED TO KNOW:   Bipolar disorder is a long-term chemical imbalance that causes rapid changes in mood and behavior  High moods are called tanvi  Low moods are called depression  Sometimes you will feel manic and sometimes you will feel depressed  You can have alternating episodes of tanvi and depression  This is called a mixed bipolar state  DISCHARGE INSTRUCTIONS:   Contact your healthcare provider or psychiatrist if:   · You are having trouble managing your bipolar disorder  · You cannot sleep, or are sleeping all the time  · You cannot eat, or are eating more than usual     · You feel dizzy or your stomach is upset  · You cannot make it to your next meeting with your healthcare provider  · You have questions or concerns about your condition or care  Medicines:   · Medicines  may be given to help keep your mood stable, or to help you sleep  Changes in medicine are often needed as your bipolar disorder changes  · Take your medicine as directed  Contact your healthcare provider if you think your medicine is not helping or if you have side effects  Tell him or her if you are allergic to any medicine  Keep a list of the medicines, vitamins, and herbs you take  Include the amounts, and when and why you take them  Bring the list or the pill bottles to follow-up visits  Carry your medicine list with you in case of an emergency  Follow up with your healthcare provider or psychiatrist as directed: You may need to return for blood tests to monitor the levels of bipolar medicine in your blood  Manage bipolar disorder:  Watch for triggers of bipolar disorder symptoms, such as stress  Learn new ways to relax, such as deep breathing, to manage your stress  Tell someone if you feel a manic or depressive period might be coming on  Ask a friend or family member to help watch you for bipolar symptoms  Work to develop skills that will help you manage bipolar disorder   You may need to make lifestyle changes  Ask your healthcare provider or psychiatrist for resources  For support and more information:   · [address] ([address]), Office of Public Service [place] [employer], Planning, and Communications  [address], [address] [place], [address] [facility] , [place] [address]   Phone: [phone]  Phone: [phone]  Web Address: [place] tn    · Depression and [address] ([address])  [address]  [address] [facility], [address] [place] [address] , 85[facility]  Phone: [phone]  Web Address: [facility] no  [address] [place] [address] Information is for End User's use only and may not be sold, redistributed or otherwise used for commercial purposes  All illustrations and images included in CareNotes® are the copyrighted property of [employer]  or [address] [place] [address]  The above information is an  only  It is not intended as medical advice for individual conditions or treatments  Talk to your doctor, nurse or pharmacist before following any medical regimen to see if it is safe and effective for you

## 2021-07-26 NOTE — PSYCH
Assessment/Plan: Giuliana Aden presented in the session with an euthymic mood and an irritable affect  He participated in the session to some extent and seemed to lack motivation  He verbalized briefly about his financial and the housing situation  He added that he tries to maintain his life without psychiatric medications  He reported irritability and anxiety due to the non congenial living condition  He was not able to maintain focus and attention  He was easily distracted by his dtr  He was also trying to clean up some receipts/papers during the session  Plan: encourage him to practice, mindfulness, cope ahead skills, distress tolerance and the relaxation skills  There are no diagnoses linked to this encounter  Subjective: I had to go out to pay one of my bill  I am broke by the end of the month but I try my best to pay all my bill  I doing ok without the medications by keeping myself busy  I had to pay 25$ just for a key to use it for the mail box  We are still looking an apartment in [facility]  I feel frustrated with my situation but I have to wait until we find a better apartment  We have to live in [facility] and cannot move anywhere else  We have been on the waiting list for the [facility] housing  I have to call them again in April to check the status of my waiting list  I have been eating and sleeping better  I am not having any mood swings and anxiety  My pain is manageable for now  My wife is planning to call the Psych office to get an appt with a therapist  She would like to do the video visits as she cannot travel in the bus  Patient ID: Sherri Gil  is a 41 y o  male  HPI- 2:30pm to 3:00 = 30 min    Review of Systems      Objective: Giuliana Aden was cleaning up his closet and drawers  He was distracted through out the session  The therapist tried to help him bring his attention back  He was less upset or agitated in the session today   He denied any major issues other than the financial and the housing situation  He is doing better without the psychotropic med's currently  He has been apprehensive about the side effects  He is able to cope better by practicing coping skills currently  Zeeshan's Internet suddenly went off so he requested for the 30 min session tomorrow         Physical Exam

## 2021-07-27 ENCOUNTER — TELEMEDICINE (OUTPATIENT)
Dept: BEHAVIORAL/MENTAL HEALTH CLINIC | Facility: CLINIC | Age: 46
End: 2021-07-27
Payer: COMMERCIAL

## 2021-07-27 DIAGNOSIS — F31.61 MILD MIXED BIPOLAR I DISORDER (HCC): Primary | ICD-10-CM

## 2021-07-27 PROCEDURE — 90832 PSYTX W PT 30 MINUTES: CPT | Performed by: SOCIAL WORKER

## 2021-07-27 NOTE — BH TREATMENT PLAN
[facility] Treatment Plan    Surendra Coleman   1975  Date of Treatment Plan: 4/28/2021    Treatment Goals (after each item selected, indicate outcome measures; (ie: as evidenced by)    [x] Reduce Risk Factors of: mismanaged emotions joshua anger- Lolis Ramires is able to manage irritability and anger to some extent = mild progress   [x] Reduce Major Symptoms of: depression and anxiety- He reports mild irritability and anger = mild progress    [x] Ameliorate Functional Impairments of: he lacks energy but has interest in activity's  = moderate progress   [x] Develop Coping Strategies to Address Stress of: financial, housing and IPR-inter personal relationship conflicts- He is able to cope with the stressors to some extent- mild progress   [x] Stabilize (short term) Crisis of: he is able to cope with the financial stress and negative living situation - moderate progress   [x] Maintain (long term) Stabilization of Symptoms of: He is able to manage depression and anxiety but has anger/irritability- mild progress   [] Medication Referral to:   [x] Maintain Sobriety: nicotine and marijuana- 90 cigarettes for 1-2 weeks- mild progress      Planned Interventions- Patient Participation (must be consistent with treatment goals):    [x] Assertiveness Training- Lolis Ramires was helped to learn the assertiveness skills- [x] Problem Solving Skills Training- he was helped to learn the problem solving skills   [x] Anger Management- CBT and DBT skills [x] Solution Focused Techniques- he is able to focus   [] Affect Identification and Expression [x] Stress Management- currently feeling overwhelmed due to multiple psychosocial issues   [] Cognitive Restructuring [x] Supportive Therapy- therapist uses supportive psychotherapy and validates Rauls feelings      [x] Communication Training = direct affective communication without the critical comments and the cursing [] Self/Other [place] Training    [x] Grief Work - grief therapy for the baby he has lost in 2019 [x] Decision Options Exploration- Therapist plans to teach him the pros and cons   [x] Imagery/Relaxation Training- Rosalie Plata was suggested to practice deep breathing and the relaxation skills for 5-10 min daily [] Decision Option Exploration   [] Parent Training  [x] Pattern Identification and Interruption- to be discussed       [x] Engage Significant Others in Treatment: Rosalie Plata was encouraged to participate in couple therapy session    [] Facilitate Decision Making Regarding:   [] Explore/Monitor: addictive thinking and behaviors   [x] Teach Skills of: positive coping and problem solving skills   [x] Educate Regarding: depression and anxiety   [] Assign Readings:   [] Referrals Planned:   [x] Use of Resources/Strengths: family support, good work skills   [x] Preventative Strategies: positive distractions   [x] Obstacles to Change: changes in motivational level     Estimated Time Frames: 7/28/2021     Goal 1: to manage the depression and the mood swings  Goal 2: To manage anxiety and learn relaxation skills    I have been provided education on my primary diagnosis of: Mild mixed Bipolar I d/o     My therapist and I have developed this plan together, and I am in agreement to working on these issues and goals  I understand the plan that has been developed for my treatment  [] Patient Declined copy of treatment plan

## 2021-07-27 NOTE — PATIENT INSTRUCTIONS
Bipolar Disorder   WHAT YOU NEED TO KNOW:   Bipolar disorder is a long-term chemical imbalance that causes rapid changes in mood and behavior  High moods are called tanvi  Low moods are called depression  Sometimes you will feel manic and sometimes you will feel depressed  You can have alternating episodes of tanvi and depression  This is called a mixed bipolar state  DISCHARGE INSTRUCTIONS:   Contact your healthcare provider or psychiatrist if:   · You are having trouble managing your bipolar disorder  · You cannot sleep, or are sleeping all the time  · You cannot eat, or are eating more than usual     · You feel dizzy or your stomach is upset  · You cannot make it to your next meeting with your healthcare provider  · You have questions or concerns about your condition or care  Medicines:   · Medicines  may be given to help keep your mood stable, or to help you sleep  Changes in medicine are often needed as your bipolar disorder changes  · Take your medicine as directed  Contact your healthcare provider if you think your medicine is not helping or if you have side effects  Tell him or her if you are allergic to any medicine  Keep a list of the medicines, vitamins, and herbs you take  Include the amounts, and when and why you take them  Bring the list or the pill bottles to follow-up visits  Carry your medicine list with you in case of an emergency  Follow up with your healthcare provider or psychiatrist as directed: You may need to return for blood tests to monitor the levels of bipolar medicine in your blood  Manage bipolar disorder:  Watch for triggers of bipolar disorder symptoms, such as stress  Learn new ways to relax, such as deep breathing, to manage your stress  Tell someone if you feel a manic or depressive period might be coming on  Ask a friend or family member to help watch you for bipolar symptoms  Work to develop skills that will help you manage bipolar disorder   You may need to make lifestyle changes  Ask your healthcare provider or psychiatrist for resources  For support and more information:   · [address] ([address]), Office of [employer]  [address], [address] [place], [facility] , [place] [address]   Phone: [phone]  Phone: [phone]  Web Address: [facility] tn    · Depression and [address] ([address])  [address] [facility], [address] [place] [address] , [address] [address]  Phone: [phone]  Web Address: [facility] no  [address] [place] [address] Information is for End User's use only and may not be sold, redistributed or otherwise used for commercial purposes  All illustrations and images included in CareNotes® are the copyrighted property of [employer]  or [address] [place] [address]  The above information is an  only  It is not intended as medical advice for individual conditions or treatments  Talk to your doctor, nurse or pharmacist before following any medical regimen to see if it is safe and effective for you

## 2021-07-27 NOTE — PSYCH
Assessment/Plan: Gopi Mcallister presented in the session with an irritable mood and affect  He participated in the session and verbalized his feelings of anger in detail  He was angry and upset about a situation with his mom  He continues to be angry, upset about his financial and the housing situation  He continues to have irritability and anxiety due to the two issues mentioned above  Plan: encourage him to practice, mindfulness, cope ahead skills, distress tolerance and the relaxation skills  There are no diagnoses linked to this encounter  Subjective: I got into an argument with my mom  I owed her money so, she took my daughters gift card  I owed her $36 and she took my daughters gift card worth $45  My daughter got the gift when she had her Mosque  My younger sister and my older sister came to know about it  They got really mad at her and shouted at her  My dad also shouted at her for being sneaky and mean  I am broke that's why my sister helps me buy the diapers and the wipes  She even drops the grocery's and the diapers at my apartment  I had to pay 25$ just for a key and then my mom took $40  I could have used the $65 until the end of this month  I feel frustrated and upset with my situation  Patient ID: Quin Chance  is a 41 y o  male  HPI- 11:15am to 11:45:00 = 30 min    Review of Systems      Objective: Goip Mcallister was visibly angry and upset due to the multiple stressful issues  He has been angry and upset with his mom for taking his daughters money  He feels overwhelmed and frustrated due to the financial and his housing situation  Any stressful issue can trigger his anger and irritability  He has been unable to relax and has sleep issues due to the stressors  He is also unable to practice the coping and the relaxation skills              Physical Exam

## 2021-07-28 ENCOUNTER — TELEPHONE (OUTPATIENT)
Dept: FAMILY MEDICINE CLINIC | Facility: CLINIC | Age: 46
End: 2021-07-28

## 2021-08-02 ENCOUNTER — TELEPHONE (OUTPATIENT)
Dept: BEHAVIORAL/MENTAL HEALTH CLINIC | Facility: CLINIC | Age: 46
End: 2021-08-02

## 2021-08-02 NOTE — TELEPHONE ENCOUNTER
This therapist contacted Jesus Lauren and left a voicemail  Jesus Lauren was reminded about his appointment  Later Jesus Lauren called back and reported that the text or the email link for [address] [place] [address] doesn't work in his phone  He added that he has been using his wife's phone until he gets a new one   He agreed to try again on Wednesday at 11am

## 2021-08-04 ENCOUNTER — TELEPHONE (OUTPATIENT)
Dept: BEHAVIORAL/MENTAL HEALTH CLINIC | Facility: CLINIC | Age: 46
End: 2021-08-04

## 2021-08-04 NOTE — TELEPHONE ENCOUNTER
This therapist contacted Mariela Landon and left a voicemail reminding him about his appt at [place] [address]  called back and stated that his 1yr old daughter is not well and has an appt at 8  He agreed to have an aapt at [facility]

## 2021-08-05 ENCOUNTER — TELEPHONE (OUTPATIENT)
Dept: BEHAVIORAL/MENTAL HEALTH CLINIC | Facility: CLINIC | Age: 46
End: 2021-08-05

## 2021-08-05 ENCOUNTER — VBI (OUTPATIENT)
Dept: ADMINISTRATIVE | Facility: OTHER | Age: 46
End: 2021-08-05

## 2021-08-05 NOTE — TELEPHONE ENCOUNTER
This therapist contacted Tatyana Simmons and reminded him about his appt  He is awaiting a call back from Tatyana Simmons

## 2021-08-10 ENCOUNTER — TELEMEDICINE (OUTPATIENT)
Dept: BEHAVIORAL/MENTAL HEALTH CLINIC | Facility: CLINIC | Age: 46
End: 2021-08-10
Payer: COMMERCIAL

## 2021-08-10 DIAGNOSIS — F31.61 MILD MIXED BIPOLAR I DISORDER (HCC): Primary | ICD-10-CM

## 2021-08-10 DIAGNOSIS — F06.30 MOOD DISORDER DUE TO A GENERAL MEDICAL CONDITION: ICD-10-CM

## 2021-08-10 PROCEDURE — 90834 PSYTX W PT 45 MINUTES: CPT | Performed by: SOCIAL WORKER

## 2021-08-10 NOTE — PSYCH
Assessment/Plan: Pato Velasquez presented in the session with a sad mood and affect  He seemed to be withdrawn and participated only to some extent in the session  He had decreased productivity of speech  He was limited in the expression of his feelings and emotions  He expressed that he feels helpless about his financial constraints and the housing situation  He continues to have anger, irritability and anxiety due to the issues mentioned above  Plan: encourage him to practice, the problem solving skills, cope ahead skills, distress tolerance and the relaxation skills  There are no diagnoses linked to this encounter  Subjective: I need to see my doctor this week for my medications  I got some hope because I am 89 on the waiting list for HUD housing  They go by income so I requested for a 3 bedroom  The rent will be much cheaper like 600-700$  Its very frustrating to live in the current apartment  I have to wait for at least 3 to 4 months  Then I have to get the furniture once I get the apartment  My cousin plans to live with us and chip in the rent  My pain is under control and I am doing good otherwise  My mood swings, depression and  Anxiety are very low  My daughter was not doing well which made me feel depressed and anxious  I don't have any other stressful issues currently  Patient ID: Robles Ching  is a 41 y o  male  HPI- 11:00am to 11:45:00 = 45 min    Review of Systems      Objective: Pato Velasquez was visibly sad and had decreased productivity of speech  He remained as a silent observer most part of the session  He feels frustrated due to the financial and the housing situation  He has been feeling helpless due to these two issues  He also feels hopeless as she has to wait for few months or more  Pato Velasquez is aware that any stressful issues can trigger his anger and irritability  He has been unable to practice the skills learnt in the session   He is unable to relax and has sleep issues which increases his stress               Physical Exam

## 2021-08-10 NOTE — BH TREATMENT PLAN
[facility] Treatment Plan    Berry Jones   1975  Date of Treatment Plan: 8/10/2021    Treatment Goals (after each item selected, indicate outcome measures; (ie: as evidenced by)    [x] Reduce Risk Factors of: mismanaged emotions joshua anger- Mario Green is able to manage irritability and anger  = moderate progress   [x] Reduce Major Symptoms of: depression and anxiety- He seemed to be sad today = mild progress    [x] Ameliorate Functional Impairments of: he lacks energy and lacks interest in activity's  = poor progress   [x] Develop Coping Strategies to Address Stress of: financial, housing and IPR-inter personal relationship conflicts- He is able to cope with the stressors to some extent- mild progress   [x] Stabilize (short term) Crisis of: he is able to cope with the financial stress and negative living situation to some extent- mild progress   [x] Maintain (long term) Stabilization of Symptoms of: He is able to manage anger/irritability but has mild depression and anxiety - mild progress   [] Medication Referral to:   [x] Maintain Sobriety: nicotine and marijuana-  cigarettes for 1-2 weeks- poor progress      Planned Interventions- Patient Participation (must be consistent with treatment goals):    [x] Assertiveness Training- Mario Green was helped to learn the assertiveness skills- [x] Problem Solving Skills Training- he was helped to learn the problem solving skills   [x] Anger Management- CBT and DBT skills [x] Solution Focused Techniques- he is able to focus   [] Affect Identification and Expression [x] Stress Management- currently feeling overwhelmed due to multiple psychosocial issues   [] Cognitive Restructuring [x] Supportive Therapy- therapist uses supportive psychotherapy and validates Rauls feelings      [x] Communication Training = direct affective communication without the critical comments and the cursing [] Self/Other [place] Training    [x] Grief Work - grief therapy for the baby he has lost in 2019 [x] Decision Options Exploration- Therapist plans to teach him the pros and cons   [x] Imagery/Relaxation Training- Jesus Lauren was suggested to practice deep breathing and the relaxation skills for 5-10 min daily [] Decision Option Exploration   [] Parent Training  [x] Pattern Identification and Interruption- to be discussed       [x] Engage Significant Others in Treatment: Jesus Lauren was encouraged to participate in couple therapy session    [] Facilitate Decision Making Regarding:   [] Explore/Monitor: addictive thinking and behaviors   [x] Teach Skills of: positive coping and problem solving skills   [x] Educate Regarding: depression and anxiety   [] Assign Readings:   [] Referrals Planned:   [x] Use of Resources/Strengths: family support, good work skills   [x] Preventative Strategies: positive distractions   [x] Obstacles to Change: changes in motivational level     Estimated Time Frames: 11/10/2021     Goal 1: Learn to manage anger/irritability and the mood swings  Goal 2: Learn to relax and manage his anxiety and feelings of frustration  I have been provided education on my primary diagnosis of: Mild mixed Bipolar I d/o     My therapist and I have developed this plan together, and I am in agreement to working on these issues and goals  I understand the plan that has been developed for my treatment  [] Patient Declined copy of treatment plan

## 2021-08-11 NOTE — PATIENT INSTRUCTIONS
Mood Disorders   WHAT YOU NEED TO KNOW:   A mood disorder, or affective disorder, is a condition that causes your mood or emotions to be out of control  Your mood can affect your personality and how you act  It can also affect how you feel about yourself and life in general   DISCHARGE INSTRUCTIONS:   Medicines:   · Medicines  can help control your moods  · Take your medicine as directed  Contact your healthcare provider if you think your medicine is not helping or if you have side effects  Tell him of her if you are allergic to any medicine  Keep a list of the medicines, vitamins, and herbs you take  Include the amounts, and when and why you take them  Bring the list or the pill bottles to follow-up visits  Carry your medicine list with you in case of an emergency  Follow up with your healthcare provider as directed: You may need to return for regular visits or blood tests  Write down your questions so you remember to ask them during your visits  Self-care:   · Try to get 6 to 8 hours of sleep each night  Contact your healthcare provider if you have trouble sleeping  · Manage your stress  Learn new ways to relax, such as deep breathing or meditation  · Talk to someone about how you feel  Join a support group  Talk to your healthcare provider, family, or friends about your feelings  Tell them about things that upset you  · Exercise regularly  Ask about the best exercise plan for you  Most healthcare providers recommend 30 minutes each day, 5 days a week  Exercise helps to lower stress and manage your moods  Contact your healthcare provider if:   · You are depressed  · You feel anxious or worried  · You begin to drink alcohol, or you drink more than usual     · You take illegal drugs  · You take medicines that are not prescribed to you  · Your medicine causes you to feel drowsy, keeps you awake, or affects how much you eat      · You have questions or concerns about your condition or care  Return to the emergency department if:   · You have severe depression  · You want to hurt yourself or others  © Copyright [employer] 2021 Information is for End User's use only and may not be sold, redistributed or otherwise used for commercial purposes  All illustrations and images included in CareNotes® are the copyrighted property of A D A M , Inc  or [facility] [employer]   The above information is an  only  It is not intended as medical advice for individual conditions or treatments  Talk to your doctor, nurse or pharmacist before following any medical regimen to see if it is safe and effective for you

## 2021-08-18 NOTE — PROGRESS NOTES
A/P:  1  MDD, moderate, currently active: Continue counseling  Stop sertraline, start citalopram 10 mg daily  SE d/w pt  Return parameters d/w pt      F/U 6-8 weeks      Spend 20 minutes with patient, >50% face-to-face counseling about MDD and treatment      HPI     41year old male with history of MDD, currently seeing counselor  Started sertraline 50 mg daily  Thinks that it helped his symptoms, but he had a difficult time with SE (nausea, abdominal pain)  Still reports short temper, guilt, no hallucinations/delusions/SI/HI/violence  In a stable relationship  Has family support  + smoker  No illegal drug use   No manic episodes      PHQ-9: 8     ROS per HPI      Current Outpatient Medications:     acetaminophen (TYLENOL 8 HOUR) 650 mg CR tablet, Take by mouth, Disp: , Rfl:     albuterol (PROVENTIL HFA,VENTOLIN HFA) 90 mcg/act inhaler, Inhale 2 puffs every 6 (six) hours as needed for wheezing, Disp: 1 Inhaler, Rfl: 2    citalopram (CeleXA) 10 mg tablet, Take 1 tablet (10 mg total) by mouth daily, Disp: 30 tablet, Rfl: 2    ibuprofen (MOTRIN) 600 mg tablet, Take 600 mg by mouth every 6 (six) hours as needed for mild pain, Disp: , Rfl:     Vitals:    08/19/21 1014   BP: 114/70   Pulse: 67   Resp: 18   Temp: 97 6 °F (36 4 °C)   SpO2: 98%          GNRL: Depressed mood, normal speech content  PSYCH: No SI/HI

## 2021-08-19 ENCOUNTER — OFFICE VISIT (OUTPATIENT)
Dept: FAMILY MEDICINE CLINIC | Facility: CLINIC | Age: 46
End: 2021-08-19

## 2021-08-19 VITALS
SYSTOLIC BLOOD PRESSURE: 114 MMHG | HEART RATE: 67 BPM | TEMPERATURE: 97.6 F | WEIGHT: 186 LBS | BODY MASS INDEX: 26.63 KG/M2 | DIASTOLIC BLOOD PRESSURE: 70 MMHG | RESPIRATION RATE: 18 BRPM | OXYGEN SATURATION: 98 % | HEIGHT: 70 IN

## 2021-08-19 DIAGNOSIS — F33.1 MODERATE EPISODE OF RECURRENT MAJOR DEPRESSIVE DISORDER (HCC): Primary | ICD-10-CM

## 2021-08-19 PROCEDURE — 3008F BODY MASS INDEX DOCD: CPT | Performed by: FAMILY MEDICINE

## 2021-08-19 PROCEDURE — 99213 OFFICE O/P EST LOW 20 MIN: CPT | Performed by: FAMILY MEDICINE

## 2021-08-19 PROCEDURE — 3725F SCREEN DEPRESSION PERFORMED: CPT | Performed by: FAMILY MEDICINE

## 2021-08-19 PROCEDURE — 1036F TOBACCO NON-USER: CPT | Performed by: FAMILY MEDICINE

## 2021-08-19 RX ORDER — CITALOPRAM 10 MG/1
10 TABLET ORAL DAILY
Qty: 30 TABLET | Refills: 2 | Status: SHIPPED | OUTPATIENT
Start: 2021-08-19

## 2021-08-23 ENCOUNTER — TELEMEDICINE (OUTPATIENT)
Dept: BEHAVIORAL/MENTAL HEALTH CLINIC | Facility: CLINIC | Age: 46
End: 2021-08-23
Payer: COMMERCIAL

## 2021-08-23 DIAGNOSIS — F31.62 MODERATE MIXED BIPOLAR I DISORDER (HCC): Primary | ICD-10-CM

## 2021-08-23 PROCEDURE — 90834 PSYTX W PT 45 MINUTES: CPT | Performed by: SOCIAL WORKER
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
## 2021-08-23 NOTE — PATIENT INSTRUCTIONS
Mood Disorders   WHAT YOU NEED TO KNOW:   A mood disorder, or affective disorder, is a condition that causes your mood or emotions to be out of control  Your mood can affect your personality and how you act  It can also affect how you feel about yourself and life in general   DISCHARGE INSTRUCTIONS:   Medicines:   · Medicines  can help control your moods  · Take your medicine as directed  Contact your healthcare provider if you think your medicine is not helping or if you have side effects  Tell him of her if you are allergic to any medicine  Keep a list of the medicines, vitamins, and herbs you take  Include the amounts, and when and why you take them  Bring the list or the pill bottles to follow-up visits  Carry your medicine list with you in case of an emergency  Follow up with your healthcare provider as directed: You may need to return for regular visits or blood tests  Write down your questions so you remember to ask them during your visits  Self-care:   · Try to get 6 to 8 hours of sleep each night  Contact your healthcare provider if you have trouble sleeping  · Manage your stress  Learn new ways to relax, such as deep breathing or meditation  · Talk to someone about how you feel  Join a support group  Talk to your healthcare provider, family, or friends about your feelings  Tell them about things that upset you  · Exercise regularly  Ask about the best exercise plan for you  Most healthcare providers recommend 30 minutes each day, 5 days a week  Exercise helps to lower stress and manage your moods  Contact your healthcare provider if:   · You are depressed  · You feel anxious or worried  · You begin to drink alcohol, or you drink more than usual     · You take illegal drugs  · You take medicines that are not prescribed to you  · Your medicine causes you to feel drowsy, keeps you awake, or affects how much you eat      · You have questions or concerns about your condition or care  Return to the emergency department if:   · You have severe depression  · You want to hurt yourself or others  © Copyright [employer] 2021 Information is for End User's use only and may not be sold, redistributed or otherwise used for commercial purposes  All illustrations and images included in CareNotes® are the copyrighted property of A D A M , Inc  or [facility] August   The above information is an  only  It is not intended as medical advice for individual conditions or treatments  Talk to your doctor, nurse or pharmacist before following any medical regimen to see if it is safe and effective for you

## 2021-08-23 NOTE — BH TREATMENT PLAN
[facility] Integration Treatment Plan    Bee Wallace   1975  Date of Treatment Plan: 8/10/2021    Treatment Goals (after each item selected, indicate outcome measures; (ie: as evidenced by)    [x] Reduce Risk Factors of: mismanaged emotions joshua anger- Joy Hatfield is unable to manage irritability and anger  = poor progress   [x] Reduce Major Symptoms of: depression and anxiety- He was angry, upset and anxious today = poor progress    [x] Ameliorate Functional Impairments of: he lacks interest in activity's  = poor progress   [x] Develop Coping Strategies to Address Stress of: financial, housing and IPR-inter personal relationship conflicts- He is unable to cope with the stressors- poor progress   [x] Stabilize (short term) Crisis of: he is unable to cope with the financial stress and negative living situation- poor progress   [x] Maintain (long term) Stabilization of Symptoms of: He is unable to manage anger/irritability and anxiety - poor progress   [] Medication Referral to:   [x] Maintain Sobriety: nicotine and marijuana-  cigarettes for 1-2 weeks- poor progress      Planned Interventions- Patient Participation (must be consistent with treatment goals):    [] Assertiveness Training- Joy Hatfield was helped to learn the assertiveness skills- [x] Problem Solving Skills Training- he was helped to learn the problem solving skills   [x] Anger Management- CBT and DBT skills [x] Solution Focused Techniques- he is able to focus   [] Affect Identification and Expression [x] Stress Management- currently feeling overwhelmed due to multiple psychosocial issues   [] Cognitive Restructuring [x] Supportive Therapy- therapist uses supportive psychotherapy and validates Rauls feelings      [x] Communication Training = direct affective communication without the critical comments and the cursing [] Self/Other [place] Training    [] Grief Work - grief therapy for the baby he has lost in 2019 [x] Decision Options Exploration- Therapist helps him learn the pros and cons   [x] Imagery/Relaxation Training- Tatyana Simmons was suggested to practice deep breathing and the relaxation skills for 5-10 min daily [] Decision Option Exploration   [] Parent Training  [] Pattern Identification and Interruption-        [x] Engage Significant Others in Treatment: Tatyana Simmons was encouraged to participate in couple therapy session    [] Facilitate Decision Making Regarding:   [] Explore/Monitor: addictive thinking and behaviors   [x] Teach Skills of: positive coping and problem solving skills   [x] Educate Regarding: depression and anxiety   [] Assign Readings:   [] Referrals Planned:   [x] Use of Resources/Strengths: family support, good work skills   [x] Preventative Strategies: positive distractions   [x] Obstacles to Change: changes in motivational level     Estimated Time Frames: 11/10/2021     Goal 1: Learn to manage anger/irritability and the mood swings  Goal 2: Learn to relax and manage his anxiety and feelings of frustration  I have been provided education on my primary diagnosis of: Moderate mixed Bipolar I d/o     My therapist and I have developed this plan together, and I am in agreement to working on these issues and goals  I understand the plan that has been developed for my treatment  [] Patient Declined copy of treatment plan

## 2021-10-06 ENCOUNTER — HOSPITAL ENCOUNTER (EMERGENCY)
Facility: HOSPITAL | Age: 46
Discharge: HOME | End: 2021-10-06
Attending: EMERGENCY MEDICINE
Payer: COMMERCIAL

## 2021-10-06 VITALS
DIASTOLIC BLOOD PRESSURE: 78 MMHG | OXYGEN SATURATION: 99 % | BODY MASS INDEX: 26.07 KG/M2 | SYSTOLIC BLOOD PRESSURE: 140 MMHG | RESPIRATION RATE: 18 BRPM | TEMPERATURE: 96.2 F | WEIGHT: 181.7 LBS | HEART RATE: 81 BPM

## 2021-10-06 DIAGNOSIS — K04.7 PERIAPICAL ABSCESS: ICD-10-CM

## 2021-10-06 DIAGNOSIS — K08.89 DENTALGIA: Primary | ICD-10-CM

## 2021-10-06 PROCEDURE — 99284 EMERGENCY DEPT VISIT MOD MDM: CPT | Performed by: EMERGENCY MEDICINE

## 2021-10-06 PROCEDURE — 99282 EMERGENCY DEPT VISIT SF MDM: CPT

## 2021-10-06 RX ORDER — PENICILLIN V POTASSIUM 500 MG/1
500 TABLET ORAL 4 TIMES DAILY
Qty: 28 TABLET | Refills: 0 | Status: SHIPPED | OUTPATIENT
Start: 2021-10-06 | End: 2021-10-13

## 2021-10-10 ENCOUNTER — HOSPITAL ENCOUNTER (EMERGENCY)
Facility: HOSPITAL | Age: 46
Discharge: HOME | End: 2021-10-10
Attending: EMERGENCY MEDICINE
Payer: COMMERCIAL

## 2021-10-10 VITALS
HEART RATE: 68 BPM | RESPIRATION RATE: 16 BRPM | DIASTOLIC BLOOD PRESSURE: 71 MMHG | OXYGEN SATURATION: 99 % | TEMPERATURE: 97.2 F | BODY MASS INDEX: 25.05 KG/M2 | SYSTOLIC BLOOD PRESSURE: 135 MMHG | WEIGHT: 174.6 LBS

## 2021-10-10 DIAGNOSIS — K08.89 TOOTHACHE: ICD-10-CM

## 2021-10-10 DIAGNOSIS — R59.1 LYMPHADENOPATHY: Primary | ICD-10-CM

## 2021-10-10 PROCEDURE — 99282 EMERGENCY DEPT VISIT SF MDM: CPT | Performed by: PHYSICIAN ASSISTANT

## 2021-10-10 PROCEDURE — 99283 EMERGENCY DEPT VISIT LOW MDM: CPT

## 2022-01-10 ENCOUNTER — OFFICE VISIT (OUTPATIENT)
Dept: FAMILY MEDICINE CLINIC | Facility: CLINIC | Age: 47
End: 2022-01-10

## 2022-01-10 DIAGNOSIS — Z20.822 CONTACT WITH AND (SUSPECTED) EXPOSURE TO COVID-19: Primary | ICD-10-CM

## 2022-01-10 PROCEDURE — G2025 DIS SITE TELE SVCS RHC/FQHC: HCPCS | Performed by: FAMILY MEDICINE

## 2022-01-10 NOTE — PROGRESS NOTES
COVID-19 Outpatient Progress Note    Assessment/Plan:    Problem List Items Addressed This Visit        Other    Contact with and (suspected) exposure to covid-19 - Primary    Relevant Orders    COVID Only - Office Collect         Disposition:     Recommended patient to come to the office to test for COVID-19  Patient is fully vaccinated and is not yet due for their booster shot  According to CDC guidelines, quarantine is not required after close contact exposure and they were advised to wear a mask for 10 days after the exposure  If patient were to develop symptoms, they should immediately self isolate and call our office for further guidance  Connected to patient via telephone  Previously was speaking with his wife who dictated that both of patient and his wife would like to get tested confirmation of COVID negative protection of the daughter  Upon speaking with patient and indicated once again that  Given vaccination status of both wife and himself, time from exposure and remaining asymptomatic, that there is no need for testing at this time however if so desired this may be billed for 99 dollars  Patient expressed surprise and   Phone line disconnected  Attempted to reconnect with patient 3 times phone rang until voicemail was eft  I have spent 10 minutes directly with the patient  Greater than 50% of this time was spent in counseling/coordination of care regarding: risks and benefits of treatment options, instructions for management, patient and family education and impressions  Encounter provider [address] [place] [address]    Provider located at [address] [address] [phone]    Recent Visits  No visits were found meeting these conditions    Showing recent visits within past 7 days and meeting all other requirements  Today's Visits  Date Type Provider Dept   01/10/22 Office Visit [facility]   Showing today's visits and meeting all other requirements  Future Appointments  No visits were found meeting these conditions  Showing future appointments within next 150 days and meeting all other requirements     This virtual check-in was done via telephone and he agrees to proceed  Patient agrees to participate in a virtual check in via telephone or video visit instead of presenting to the office to address urgent/immediate medical needs  Patient is aware this is a billable service  After connecting through Telephone, the patient was identified by name and date of birth  Karina Granda  was informed that this was a telemedicine visit and that the exam was being conducted confidentially over secure lines  My office door was closed  No one else was in the room  Karina Granda  acknowledged consent and understanding of privacy and security of the telemedicine visit  I informed the patient that I have reviewed his record in Epic and presented the opportunity for him to ask any questions regarding the visit today  The patient agreed to participate  Verification of patient location:  Patient is located in the following state in which I hold an active license: [place]    Subjective:   Karina Garnda  is a 40 y o  male who is concerned about COVID-19  Patient is currently asymptomatic  Patient denies fever, chills, fatigue, malaise, congestion, rhinorrhea, sore throat, anosmia, loss of taste, cough, shortness of breath, chest tightness, abdominal pain, nausea, vomiting, diarrhea, myalgias and headaches       - Date of exposure: 12/25/2021      COVID-19 vaccination status: Fully vaccinated (primary series)    Exposure:   Contact with a person who is under investigation (PUI) for or who is positive for COVID-19 within the last 14 days?: Yes    Hospitalized recently for fever and/or lower respiratory symptoms?: No      Currently a healthcare worker that is involved in direct patient care?: No      Works in a special setting where the risk of COVID-19 transmission may be high? (this may include long-term care, correctional and [facility] facilities; homeless shelters; assisted-living facilities and group homes ): No      Resident in a special setting where the risk of COVID-19 transmission may be high? (this may include long-term care, correctional and [facility] facilities; homeless shelters; assisted-living facilities and group homes ): No      No results found for: SARSCOV2, [address] [facility], SARSCORONAVI, CORONAVIRUSR, [facility]  Past Medical History:   Diagnosis Date    Asthma      Past Surgical History:   Procedure Laterality Date    HIP FRACTURE SURGERY      HIP SURGERY Right     screws and plate    HIP SURGERY      JOINT REPLACEMENT      STOMACH SURGERY      TOTAL HIP ARTHROPLASTY       Current Outpatient Medications   Medication Sig Dispense Refill    acetaminophen (TYLENOL 8 HOUR) 650 mg CR tablet Take by mouth      albuterol (PROVENTIL HFA,VENTOLIN HFA) 90 mcg/act inhaler Inhale 2 puffs every 6 (six) hours as needed for wheezing 1 Inhaler 2    citalopram (CeleXA) 10 mg tablet Take 1 tablet (10 mg total) by mouth daily 30 tablet 2    ibuprofen (MOTRIN) 600 mg tablet Take 600 mg by mouth every 6 (six) hours as needed for mild pain       No current facility-administered medications for this visit  No Known Allergies    Review of Systems   Constitutional: Negative for chills, fatigue and fever  HENT: Negative for congestion, rhinorrhea and sore throat  Respiratory: Negative for cough, chest tightness and shortness of breath  Gastrointestinal: Negative for abdominal pain, diarrhea, nausea and vomiting  Musculoskeletal: Negative for myalgias  Neurological: Negative for headaches  Objective: There were no vitals filed for this visit  Physical Exam  Vitals reviewed: Visit conducted virtually           VIRTUAL VISIT DISCLAIMER    Barb Krishnan  verbally agrees to participate in [employer]  Pt is aware that [employer] could be limited without vital signs or the ability to perform a full hands-on physical exam  Zeeshan Hope  understands he or the provider may request at any time to terminate the video visit and request the patient to seek care or treatment in person

## 2022-01-21 ENCOUNTER — OFFICE VISIT (OUTPATIENT)
Dept: FAMILY MEDICINE CLINIC | Facility: CLINIC | Age: 47
End: 2022-01-21

## 2022-01-21 DIAGNOSIS — Z20.822 CONTACT WITH AND (SUSPECTED) EXPOSURE TO COVID-19: Primary | ICD-10-CM

## 2022-01-21 PROCEDURE — G2025 DIS SITE TELE SVCS RHC/FQHC: HCPCS | Performed by: FAMILY MEDICINE

## 2022-01-21 NOTE — ASSESSMENT & PLAN NOTE
Recent contact with the patient who tested positive for COVID that lives in his building, reports that at positive COVID patient does not wear her mask  This patient is vaccinated and does not have any current symptoms right now, and he wears a mask     Given that he is vaccinated, given that he does not have any symptoms  The recommendation is that he continuee to wear mask and to continue to avoid tiring and people are COVID  Will hold off on ordering for COVID test until if he develops any symptoms  Patient is understanding of this plan and is agreeable with

## 2022-01-21 NOTE — PROGRESS NOTES
COVID-19 Outpatient Progress Note    Assessment/Plan:    Problem List Items Addressed This Visit        Other    Contact with and (suspected) exposure to covid-19 - Primary     Recent contact with the patient who tested positive for COVID that lives in his building, reports that at positive COVID patient does not wear her mask  This patient is vaccinated and does not have any current symptoms right now, and he wears a mask     Given that he is vaccinated, given that he does not have any symptoms  The recommendation is that he continuee to wear mask and to continue to avoid tiring and people are COVID  Will hold off on ordering for COVID test until if he develops any symptoms  Patient is understanding of this plan and is agreeable with              Disposition:     Patient is fully vaccinated and I recommended self quarantine for 5 days followed by strict mask use for an additional 5 days  If patient were to develop symptoms, they should immediately self isolate and call our office for further guidance  I have spent 5 minutes directly with the patient  Greater than 50% of this time was spent in counseling/coordination of care regarding: prognosis, risks and benefits of treatment options, instructions for management, patient and family education, importance of treatment compliance, risk factor reductions and impressions  Encounter provider [address] [place] [address]    Provider located at [address] [address] [phone]    Recent Visits  No visits were found meeting these conditions  Showing recent visits within past 7 days and meeting all other requirements  Today's Visits  Date Type Provider Dept   01/21/22 Office Visit [facility]   Showing today's visits and meeting all other requirements  Future Appointments  No visits were found meeting these conditions    Showing future appointments within next 150 days and meeting all other requirements     This virtual check-in was done via telephone and he agrees to proceed  Patient agrees to participate in a virtual check in via telephone or video visit instead of presenting to the office to address urgent/immediate medical needs  Patient is aware this is a billable service  After connecting through Telephone, the patient was identified by name and date of birth  Ronen Humphries  was informed that this was a telemedicine visit and that the exam was being conducted confidentially over secure lines  My office door was closed  No one else was in the room  Ronen Humphries  acknowledged consent and understanding of privacy and security of the telemedicine visit  I informed the patient that I have reviewed his record in Epic and presented the opportunity for him to ask any questions regarding the visit today  The patient agreed to participate  It was my intent to perform this visit via video technology but the patient was not able to do a video connection so the visit was completed via audio telephone only  Verification of patient location:  Patient is located in the following state in which I hold an active license: [place]    Subjective:   Ronen Humphries  is a 40 y o  male who is concerned about COVID-19  Patient is currently asymptomatic  Patient denies fever, chills, fatigue, malaise, congestion, rhinorrhea, sore throat, anosmia, loss of taste, cough, shortness of breath, chest tightness, abdominal pain, nausea, vomiting, diarrhea, myalgias and headaches       - Date of symptom onset: 1/17/2022      COVID-19 vaccination status: Fully vaccinated (primary series)    Exposure:   Contact with a person who is under investigation (PUI) for or who is positive for COVID-19 within the last 14 days?: Yes    Hospitalized recently for fever and/or lower respiratory symptoms?: No      Currently a healthcare worker that is involved in direct patient care?: No      Works in a special setting where the risk of COVID-19 transmission may be high? (this may include long-term care, correctional and [facility] facilities; homeless shelters; assisted-living facilities and group homes ): No      Resident in a special setting where the risk of COVID-19 transmission may be high? (this may include long-term care, correctional and [facility] facilities; homeless shelters; assisted-living facilities and group homes ): No      No results found for: SARSCOV2, [address] [facility], SARSCORONAVI, CORONAVIRUSR, [address] [facility]  Past Medical History:   Diagnosis Date    Asthma      Past Surgical History:   Procedure Laterality Date    HIP FRACTURE SURGERY      HIP SURGERY Right     screws and plate    HIP SURGERY      JOINT REPLACEMENT      STOMACH SURGERY      TOTAL HIP ARTHROPLASTY       Current Outpatient Medications   Medication Sig Dispense Refill    acetaminophen (TYLENOL 8 HOUR) 650 mg CR tablet Take by mouth      albuterol (PROVENTIL HFA,VENTOLIN HFA) 90 mcg/act inhaler Inhale 2 puffs every 6 (six) hours as needed for wheezing 1 Inhaler 2    citalopram (CeleXA) 10 mg tablet Take 1 tablet (10 mg total) by mouth daily 30 tablet 2    ibuprofen (MOTRIN) 600 mg tablet Take 600 mg by mouth every 6 (six) hours as needed for mild pain       No current facility-administered medications for this visit  No Known Allergies    Review of Systems   Constitutional: Negative for chills, fatigue and fever  HENT: Negative for congestion, rhinorrhea and sore throat  Respiratory: Negative for cough, chest tightness and shortness of breath  Gastrointestinal: Negative for abdominal pain, diarrhea, nausea and vomiting  Musculoskeletal: Negative for myalgias  Neurological: Negative for headaches  Objective: There were no vitals filed for this visit      Physical Exam  Pulmonary:      Effort: Pulmonary effort is normal    Neurological:      Mental Status: He is alert and oriented to person, place, and time  VIRTUAL VISIT DISCLAIMER    Barb Krishnan  verbally agrees to participate in [employer]  Pt is aware that [employer] could be limited without vital signs or the ability to perform a full hands-on physical exam  Zeeshan Alexandre [facility]  understands he or the provider may request at any time to terminate the video visit and request the patient to seek care or treatment in person

## 2022-01-26 ENCOUNTER — IMMUNIZATIONS (OUTPATIENT)
Dept: FAMILY MEDICINE CLINIC | Facility: HOSPITAL | Age: 47
End: 2022-01-26

## 2022-01-26 DIAGNOSIS — Z23 ENCOUNTER FOR IMMUNIZATION: Primary | ICD-10-CM

## 2022-01-26 PROCEDURE — 0064A COVID-19 MODERNA VACC 0.25 ML BOOSTER: CPT

## 2022-01-26 PROCEDURE — 91306 COVID-19 MODERNA VACC 0.25 ML BOOSTER: CPT

## 2022-05-18 NOTE — PROGRESS NOTES
A/P:  1  Swelling and ecchymosis of left third PIP, s/p trauma 2 months ago  Neurovascularly intact   Radiograph today to r/o fracture  F/U 6-8 weeks      HPI     40year old male who punched a wall about two months ago and reports persistent pain and swelling in his left third finger  No numbness or tingling   Has difficulty making a fist  Has not been seen for this previously      ROS per HPI       Current Outpatient Medications:     acetaminophen (TYLENOL 8 HOUR) 650 mg CR tablet, Take by mouth, Disp: , Rfl:     albuterol (PROVENTIL HFA,VENTOLIN HFA) 90 mcg/act inhaler, Inhale 2 puffs every 6 (six) hours as needed for wheezing, Disp: 1 Inhaler, Rfl: 2    citalopram (CeleXA) 10 mg tablet, Take 1 tablet (10 mg total) by mouth daily, Disp: 30 tablet, Rfl: 2    ibuprofen (MOTRIN) 600 mg tablet, Take 600 mg by mouth every 6 (six) hours as needed for mild pain, Disp: , Rfl:     Vitals:    05/19/22 1116   BP: 106/72   Pulse: 67   Resp: 18   Temp: 97 9 °F (36 6 °C)   SpO2: 98%       GNRL: WN, WD man, NAD  MSK: swelling, ecchymosis, and TTP over PIP of left third digit, cap refill <2 seconds, sensation to light touch intact

## 2022-05-19 ENCOUNTER — OFFICE VISIT (OUTPATIENT)
Dept: FAMILY MEDICINE CLINIC | Facility: CLINIC | Age: 47
End: 2022-05-19

## 2022-05-19 ENCOUNTER — HOSPITAL ENCOUNTER (OUTPATIENT)
Dept: RADIOLOGY | Facility: HOSPITAL | Age: 47
Discharge: HOME | End: 2022-05-19
Attending: FAMILY MEDICINE
Payer: MEDICARE

## 2022-05-19 VITALS
HEART RATE: 67 BPM | OXYGEN SATURATION: 98 % | SYSTOLIC BLOOD PRESSURE: 106 MMHG | BODY MASS INDEX: 23.05 KG/M2 | WEIGHT: 161 LBS | DIASTOLIC BLOOD PRESSURE: 72 MMHG | RESPIRATION RATE: 18 BRPM | TEMPERATURE: 97.9 F | HEIGHT: 70 IN

## 2022-05-19 DIAGNOSIS — S60.00XA TRAUMATIC ECCHYMOSIS OF FINGER, INITIAL ENCOUNTER: Primary | ICD-10-CM

## 2022-05-19 DIAGNOSIS — S60.00XA TRAUMATIC ECCHYMOSIS OF FINGER, INITIAL ENCOUNTER: ICD-10-CM

## 2022-05-19 PROCEDURE — 99213 OFFICE O/P EST LOW 20 MIN: CPT | Performed by: FAMILY MEDICINE

## 2022-05-19 PROCEDURE — 73130 X-RAY EXAM OF HAND: CPT

## 2022-05-24 ENCOUNTER — TELEPHONE (OUTPATIENT)
Dept: FAMILY MEDICINE CLINIC | Facility: CLINIC | Age: 47
End: 2022-05-24

## 2022-06-01 NOTE — TELEPHONE ENCOUNTER
first attempt to contact patient   left message to return my call on answering machine    Give xray results and reschedule appt

## 2022-09-29 ENCOUNTER — OFFICE VISIT (OUTPATIENT)
Dept: FAMILY MEDICINE CLINIC | Facility: CLINIC | Age: 47
End: 2022-09-29

## 2022-09-29 VITALS
RESPIRATION RATE: 18 BRPM | SYSTOLIC BLOOD PRESSURE: 112 MMHG | WEIGHT: 177.6 LBS | HEART RATE: 72 BPM | DIASTOLIC BLOOD PRESSURE: 64 MMHG | BODY MASS INDEX: 25.43 KG/M2 | OXYGEN SATURATION: 96 % | HEIGHT: 70 IN | TEMPERATURE: 96.4 F

## 2022-09-29 DIAGNOSIS — G57.01 COMPRESSION OF RIGHT SCIATIC NERVE: Primary | ICD-10-CM

## 2022-09-29 PROCEDURE — 99213 OFFICE O/P EST LOW 20 MIN: CPT | Performed by: FAMILY MEDICINE

## 2022-09-30 NOTE — PROGRESS NOTES
A/P:  1  Compression of right sciatic nerve with prolonged sitting: Reassurance  Position change d/w pt  F/U [facility]    HPI  40year old man who feels numbness in his right leg with prolonged sitting  Improves with position change  No weakness of his leg  No back pain  No F/C/N/V/weight loss/incontinence/truma      ROS per HPI      Current Outpatient Medications:     acetaminophen (TYLENOL 8 HOUR) 650 mg CR tablet, Take by mouth, Disp: , Rfl:     albuterol (PROVENTIL HFA,VENTOLIN HFA) 90 mcg/act inhaler, Inhale 2 puffs every 6 (six) hours as needed for wheezing, Disp: 1 Inhaler, Rfl: 2    citalopram (CeleXA) 10 mg tablet, Take 1 tablet (10 mg total) by mouth daily, Disp: 30 tablet, Rfl: 2    ibuprofen (MOTRIN) 600 mg tablet, Take 600 mg by mouth every 6 (six) hours as needed for mild pain, Disp: , Rfl:     Vitals:    09/29/22 1050   BP: 112/64   Pulse: 72   Resp: 18   Temp: (!) 96 4 °F (35 8 °C)   SpO2: 96%     GNRL: WN, WD man, NAD  NEURO: STR 5/5 b/l LE, normal gait

## 2023-02-11 ENCOUNTER — HOSPITAL ENCOUNTER (EMERGENCY)
Facility: HOSPITAL | Age: 48
Discharge: HOME | End: 2023-02-11
Attending: EMERGENCY MEDICINE

## 2023-02-11 VITALS
WEIGHT: 181.66 LBS | DIASTOLIC BLOOD PRESSURE: 59 MMHG | TEMPERATURE: 98.4 F | RESPIRATION RATE: 18 BRPM | OXYGEN SATURATION: 100 % | HEART RATE: 70 BPM | SYSTOLIC BLOOD PRESSURE: 143 MMHG

## 2023-02-11 DIAGNOSIS — K04.7 DENTAL INFECTION: Primary | ICD-10-CM

## 2023-02-11 RX ORDER — IBUPROFEN 400 MG/1
400 TABLET ORAL EVERY 6 HOURS PRN
Qty: 12 TABLET | Refills: 0 | Status: SHIPPED | OUTPATIENT
Start: 2023-02-11

## 2023-02-11 RX ORDER — CHLORHEXIDINE GLUCONATE 0.12 MG/ML
15 RINSE ORAL 2 TIMES DAILY
Qty: 120 ML | Refills: 0 | Status: SHIPPED | OUTPATIENT
Start: 2023-02-11

## 2023-02-11 RX ORDER — ACETAMINOPHEN 500 MG
500 TABLET ORAL EVERY 6 HOURS PRN
Qty: 30 TABLET | Refills: 0 | Status: SHIPPED | OUTPATIENT
Start: 2023-02-11

## 2023-02-11 RX ORDER — AMOXICILLIN 500 MG/1
500 CAPSULE ORAL EVERY 12 HOURS SCHEDULED
Qty: 20 CAPSULE | Refills: 0 | Status: SHIPPED | OUTPATIENT
Start: 2023-02-11 | End: 2023-02-21

## 2023-02-11 NOTE — ED PROVIDER NOTES
History  Chief Complaint   Patient presents with   • Dental Pain     L sided dental pain began this morning; took ibuprofen ~1hr PTA     47, presents for evaluation of dental pain  Patient reports he is been having swelling to the gums and associated dental pain reports he has an appointment scheduled for multiple months from today for a dentist to address his teeth  Patient denies any fevers or chills reports he has been taking ibuprofen with moderate relief her symptoms however reports he continues to have pain and feels as though the swelling is extending up into his face  Dental Pain  Associated symptoms: no congestion and no fever        None       History reviewed  No pertinent past medical history  History reviewed  No pertinent surgical history  History reviewed  No pertinent family history  I have reviewed and agree with the history as documented  E-Cigarette/Vaping     E-Cigarette/Vaping Substances     Social History     Tobacco Use   • Smoking status: Every Day     Packs/day: 0 25     Types: Cigarettes   • Smokeless tobacco: Never       Review of Systems   Constitutional: Negative for chills, fatigue and fever  HENT: Positive for dental problem  Negative for congestion, ear pain, rhinorrhea and sore throat  Eyes: Negative for redness  Respiratory: Negative for chest tightness and shortness of breath  Cardiovascular: Negative for chest pain and palpitations  Gastrointestinal: Negative for abdominal pain, nausea and vomiting  Genitourinary: Negative for dysuria and hematuria  Musculoskeletal: Negative  Skin: Negative for rash  Neurological: Negative for dizziness, syncope, light-headedness and numbness  Physical Exam  Physical Exam  Vitals and nursing note reviewed  Constitutional:       Appearance: Normal appearance  He is well-developed  HENT:      Head: Normocephalic and atraumatic        Mouth/Throat:        Comments: Patient is managing oral secretions without difficulty speaking in full sentences  Eyes:      General: No scleral icterus  Pupils: Pupils are equal, round, and reactive to light  Cardiovascular:      Rate and Rhythm: Normal rate and regular rhythm  Pulses: Normal pulses  Pulmonary:      Effort: Pulmonary effort is normal  No respiratory distress  Breath sounds: No stridor  Abdominal:      General: There is no distension  Palpations: There is no mass  Musculoskeletal:      Cervical back: Normal range of motion  Skin:     General: Skin is warm and dry  Capillary Refill: Capillary refill takes less than 2 seconds  Coloration: Skin is not jaundiced  Neurological:      Mental Status: He is alert and oriented to person, place, and time  Gait: Gait normal    Psychiatric:         Mood and Affect: Mood normal          Vital Signs  ED Triage Vitals [02/11/23 1707]   Temperature Pulse Respirations Blood Pressure SpO2   98 4 °F (36 9 °C) 70 18 143/59 100 %      Temp src Heart Rate Source Patient Position - Orthostatic VS BP Location FiO2 (%)   -- -- -- -- --      Pain Score       --           Vitals:    02/11/23 1707   BP: 143/59   Pulse: 70         Visual Acuity      ED Medications  Medications - No data to display    Diagnostic Studies  Results Reviewed     None                 No orders to display              Procedures  Procedures         ED Course                                             Medical Decision Making  32-year-old male presenting for evaluation of dental pain, apparent dental infection on exam with associated gum swelling tenderness and significantly decayed tooth in the area  Multiple dentist follow-up information given  Amoxicillin prescribed for use at home along with a chlorhexidine rinse to help keep the area clean  No facial swelling, no significant concern for abscess or facial cellulitis  No overlying erythema to the area of infection      All imaging and/or lab testing discussed with patient, strict return to ED precautions discussed  Patient and/or family members verbalizes understanding and agrees with plan  Patient is stable for discharge     Portions of the record may have been created with voice recognition software  Occasional wrong word or "sound a like" substitutions may have occurred due to the inherent limitations of voice recognition software  Read the chart carefully and recognize, using context, where substitutions have occurred  Dental infection: complicated acute illness or injury  Risk  OTC drugs  Prescription drug management  Disposition  Final diagnoses:   Dental infection     Time reflects when diagnosis was documented in both MDM as applicable and the Disposition within this note     Time User Action Codes Description Comment    2/11/2023  5:09 PM Eunice Davis Add [K04 7] Dental infection       ED Disposition     ED Disposition   Discharge    Condition   Good    Date/Time   Sat Feb 11, 2023  5:09 PM    Comment   Cande Ran discharge to home/self care                 Follow-up Information     Follow up With Specialties Details Why [address]  Schedule an appointment as soon as possible for a visit in 2 days  [address] [facility], [address] [place] [address] [place] [address] Oral Surgery   [facility]  [address] [facility]  [phone]            Patient's Medications   Discharge Prescriptions    ACETAMINOPHEN (TYLENOL) 500 MG TABLET    Take 1 tablet (500 mg total) by mouth every 6 (six) hours as needed (pain)       Start Date: 2/11/2023 End Date: --       Order Dose: 500 mg       Quantity: 30 tablet    Refills: 0    AMOXICILLIN (AMOXIL) 500 MG CAPSULE    Take 1 capsule (500 mg total) by mouth every 12 (twelve) hours for 10 days       Start Date: 2/11/2023 End Date: 2/21/2023       Order Dose: 500 mg Quantity: 20 capsule    Refills: 0    CHLORHEXIDINE (PERIDEX) 0 12 % SOLUTION    Apply 15 mL to the mouth or throat 2 (two) times a day       Start Date: 2/11/2023 End Date: --       Order Dose: 15 mL       Quantity: 120 mL    Refills: 0    IBUPROFEN (MOTRIN) 400 MG TABLET    Take 1 tablet (400 mg total) by mouth every 6 (six) hours as needed for mild pain       Start Date: 2/11/2023 End Date: --       Order Dose: 400 mg       Quantity: 12 tablet    Refills: 0       No discharge procedures on file      PDMP Review     None          ED Provider  Electronically Signed by           Matthew Kelley PA-C  02/11/23 8313

## 2023-02-12 NOTE — ED ATTENDING ATTESTATION
I was the attending physician on duty at the time the patient visited the emergency department  The patient was evaluated by the Advanced Practitioner  I was personally available for consultation; however the patient’s care, medical decisions and disposition fell within the Advanced Practitioner’s scope of practice to independently manage the patient, unless otherwise noted      Brittnee Van MD

## 2023-07-17 NOTE — PSYCH
Assessment/Plan: Armond Moreno presented in the session with a depressed mood and affect  He actively participated in the assessment of the depression  He was able to ventilate his feelings and emotions appropriately  He denied any suicidal ideations  Plan is to complete the BDI-II today  There are no diagnoses linked to this encounter  Subjective: My mood is here and there  The depression kicks in more often than the anxiety  The anxiety and worry is next  I don't take any medications for depression and anxiety  I don't sleep good and I get nightmares  I force myself out of bed  My kids keeps me going daily  I have not seen my son Rasheeda Recinos which makes me feel angry  Patient ID: Davon Mayfield  is a 41 y o  male  HPI 2:20 to 3:15 pm- 45 min     Review of Systems      Objective:Zeeshan continues to be motivated for weekly psychotherapy sessions  He actively participated in the session and was able to complete the BDI-II successfully  His total score of 44 denotes severe depression currently  His protective factors are his children and good primary support          Physical Exam
yes

## 2023-10-06 ENCOUNTER — HOSPITAL ENCOUNTER (EMERGENCY)
Facility: HOSPITAL | Age: 48
Discharge: HOME | End: 2023-10-06
Attending: EMERGENCY MEDICINE
Payer: MEDICARE

## 2023-10-06 VITALS
RESPIRATION RATE: 16 BRPM | SYSTOLIC BLOOD PRESSURE: 146 MMHG | TEMPERATURE: 97.8 F | HEART RATE: 102 BPM | DIASTOLIC BLOOD PRESSURE: 79 MMHG | OXYGEN SATURATION: 98 %

## 2023-10-06 DIAGNOSIS — K08.89 PAIN, DENTAL: Primary | ICD-10-CM

## 2023-10-06 DIAGNOSIS — K04.7 APICAL ABSCESS: ICD-10-CM

## 2023-10-06 PROCEDURE — 99284 EMERGENCY DEPT VISIT MOD MDM: CPT | Performed by: EMERGENCY MEDICINE

## 2023-10-06 PROCEDURE — 99283 EMERGENCY DEPT VISIT LOW MDM: CPT

## 2023-10-06 RX ORDER — AMOXICILLIN 500 MG/1
500 CAPSULE ORAL 3 TIMES DAILY
Qty: 30 CAPSULE | Refills: 0 | Status: SHIPPED | OUTPATIENT
Start: 2023-10-06 | End: 2023-10-16

## 2023-10-06 NOTE — ED PROVIDER NOTES
History  Chief Complaint   Patient presents with   • Dental Pain     Upper left tooth pain with facial swelling, unable to get dental apt to have tooth removed. Has been taken OTC meds. HPI this is a 70-year-old male who is complaining of dental pain. Patient has pain in his tooth #11. Pain has been chronic and longstanding, tooth is fractured, but yesterday patient had increased swelling, swelling up to his eye. Patient states that he has been taking Tylenol and he applied an ice pack that made the swelling go down. No fevers or chills. No drainage from the eye or the face. No difficulty breathing. No underlying immunosuppression. Patient has asthma but does not take steroids. Review of systems otherwise -12 systems reviewed. Patient states that he has called 12 dentists today to find someone who can take care of his tooth. Patient states that he needs to be fully sedated to have any dental work or he will punch the dentist, and therefore he is afraid of dentists. The patient has been unable to find a dentist willing to fully sedate him to assess his tooth. Prior to Admission Medications   Prescriptions Last Dose Informant Patient Reported?  Taking?   acetaminophen (TYLENOL 8 HOUR) 650 mg CR tablet  Self Yes No   Sig: Take by mouth   acetaminophen (TYLENOL) 500 mg tablet   No No   Sig: Take 1 tablet (500 mg total) by mouth every 6 (six) hours as needed (pain)   albuterol (PROVENTIL HFA,VENTOLIN HFA) 90 mcg/act inhaler  Self No No   Sig: Inhale 2 puffs every 6 (six) hours as needed for wheezing   chlorhexidine (PERIDEX) 0.12 % solution   No No   Sig: Apply 15 mL to the mouth or throat 2 (two) times a day   citalopram (CeleXA) 10 mg tablet   No No   Sig: Take 1 tablet (10 mg total) by mouth daily   ibuprofen (MOTRIN) 400 mg tablet   No No   Sig: Take 1 tablet (400 mg total) by mouth every 6 (six) hours as needed for mild pain   ibuprofen (MOTRIN) 600 mg tablet  Self Yes No   Sig: Take 600 mg by mouth every 6 (six) hours as needed for mild pain      Facility-Administered Medications: None       Past Medical History:   Diagnosis Date   • Asthma        Past Surgical History:   Procedure Laterality Date   • HIP FRACTURE SURGERY     • HIP SURGERY Right     screws and plate   • HIP SURGERY     • JOINT REPLACEMENT     • STOMACH SURGERY     • TOTAL HIP ARTHROPLASTY         Family History   Problem Relation Age of Onset   • Asthma Maternal Uncle    • Skin cancer Father      I have reviewed and agree with the history as documented. E-Cigarette/Vaping   • E-Cigarette Use Never User      E-Cigarette/Vaping Substances   • Nicotine No    • THC No    • CBD No    • Flavoring No    • Other No    • Unknown No      Social History     Tobacco Use   • Smoking status: Every Day     Packs/day: 0.25     Types: Cigarettes   • Smokeless tobacco: Never   Vaping Use   • Vaping Use: Never used   Substance Use Topics   • Alcohol use: No   • Drug use: Not Currently       Review of Systems    Physical Exam  Physical Exam    Vital Signs  ED Triage Vitals [10/06/23 1343]   Temperature Pulse Respirations Blood Pressure SpO2   97.8 °F (36.6 °C) 102 16 146/79 98 %      Temp src Heart Rate Source Patient Position - Orthostatic VS BP Location FiO2 (%)   -- -- -- -- --      Pain Score       No Pain           Vitals:    10/06/23 1343   BP: 146/79   Pulse: 102     On exam the patient is awake and alert, he has carious tooth, the patient does not have any facial cellulitis or abscess. He does not have any palpable masses or gingival abscesses. There is no trismus. He has a supple neck with no swelling or adenopathy. The patient has clear and equal breath sounds. Visual Acuity      ED Medications  Medications - No data to display    Diagnostic Studies  Results Reviewed     None                 No orders to display          Impression: Broken tooth, facial swelling.   The patient's facial swelling has largely resolved, but I suspect that he may have an apical abscess. We will plan to treat with amoxicillin, refer to dental clinic    Procedures  Procedures         ED Course                                             MDM    Disposition  Final diagnoses:   None     ED Disposition     None      Follow-up Information    None         Patient's Medications   Discharge Prescriptions    No medications on file       No discharge procedures on file.     PDMP Review     None          ED Provider  Electronically Signed by           Carla Souza MD  10/06/23 [facility] Neetu Hogue MD  10/06/23 2214

## 2023-10-06 NOTE — DISCHARGE INSTRUCTIONS
Take antibiotics as prescribed. Follow-up with dental clinic, whose number has been provided to you.   Return to the emergency department for worsening swelling, fever, difficulty breathing, or other concerns

## 2023-10-09 ENCOUNTER — VBI (OUTPATIENT)
Dept: FAMILY MEDICINE CLINIC | Facility: CLINIC | Age: 48
End: 2023-10-09

## 2023-10-09 NOTE — TELEPHONE ENCOUNTER
10/9/2023 10:41 AM    Patient contacted post ED visit, outreach attempt made but message could not be left. Additional outreach attempt will be made. Thank you. Mickie [facility]     10/10/23 10:42 AM    Patient contacted post ED visit, outreach attempt made but message could not be left. Additional outreach attempt will be made. Thank you. Mickie [facility]    10/11/23 9:21 AM    Patient contacted post ED visit, outreach attempt made but message could not be left. Additional outreach attempt will be made. Thank you. Mickie [facility]    10/11/23 9:22 AM    Patient contacted post ED visit, phone outreaches were unsuccessful; patient does not have MyChart, a MyChart letter has not been sent. Thank you.   Mickie [facility]  10/11/23 9:22 AM

## 2024-05-08 NOTE — TELEPHONE ENCOUNTER
Joy Hatfield called to request referral for dentist Rx is pended.  Patient has an appointment scheduled this month.